# Patient Record
Sex: MALE | Race: WHITE | Employment: FULL TIME | URBAN - METROPOLITAN AREA
[De-identification: names, ages, dates, MRNs, and addresses within clinical notes are randomized per-mention and may not be internally consistent; named-entity substitution may affect disease eponyms.]

---
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
## 2020-01-02 ENCOUNTER — HOSPITAL ENCOUNTER (OUTPATIENT)
Dept: PHYSICAL THERAPY | Age: 20
Setting detail: THERAPIES SERIES
Discharge: HOME | End: 2020-01-02
Payer: COMMERCIAL

## 2020-01-02 PROCEDURE — 97112 NEUROMUSCULAR REEDUCATION: CPT

## 2020-01-02 PROCEDURE — 97110 THERAPEUTIC EXERCISES: CPT

## 2020-01-07 ENCOUNTER — HOSPITAL ENCOUNTER (OUTPATIENT)
Dept: PHYSICAL THERAPY | Age: 20
Setting detail: THERAPIES SERIES
Discharge: HOME | End: 2020-01-07
Payer: COMMERCIAL

## 2020-01-07 PROCEDURE — 97530 THERAPEUTIC ACTIVITIES: CPT

## 2020-01-07 PROCEDURE — 97112 NEUROMUSCULAR REEDUCATION: CPT

## 2020-01-07 PROCEDURE — 97110 THERAPEUTIC EXERCISES: CPT

## 2020-01-07 NOTE — FLOWSHEET NOTE
Outpatient Physical Therapy  [place]           [x] Phone: [phone]   Fax: [phone]  [facility]sh[facility] Manjarrez           [] Phone: [phone]   Fax: [phone]        Physical Therapy Daily Treatment Note  Date:  2020    Patient Name:  Dc Espino    :  2000  MRN: [id number]  Restrictions/Precautions: Other position/activity restrictions: none  Diagnosis:   Diagnosis: LBP, sciatica   Date of Injury/Surgery:   Treatment Diagnosis: Treatment Diagnosis: LBP, neural tension/irritation    Insurance/Certification information: PT Insurance Information: betty Britton   Referring Physician:  Referring Practitioner: Alphonse Bowers  Next Doctor Visit:    Plan of care signed (Y/N):  Y  Outcome Measure: Oswestry 38%  Visit# / total visits:    POC, count verified 19   Pain level: 5/10 back      Goals:       Short term goals  Time Frame for Short term goals: 3 weeks, 19  Short term goal 1: Pt will be able to report at least 25% reduction in pain   Met 12/10  Short term goal 2: Pt will be able to advance ROM and core work w/o increased symptoms   Met 12/10  Short term goal 3: Pt will demonstrate good body mech w/ all bending/lifting Met   Long term goals  Time Frame for Long term goals : 6 weeks, 1/3/20  Long term goal 1: Pt will be independent w/ pain management and HEP to continue after PT Good Progress   Long term goal 2: Pt will be able to work w/ pain < 4/10 at least 75% of the time Not met  - not currently working  Long term goal 3: Pt will be able to return to gym work outs w/o pain or limit Met - with his restrictions   Long term goal 4: Pt will have no LE symptoms w/ usual activity Mostly met     Summary of Evaluation: Assessment: Pt is a 22 yo male who presents w/ LBP w/ sciatica R. He has MRI w/ degenerative and congenital limitations unusal for his age. He demonstrates + neural tension signs and limited lumbar ROM w/ pain.   These limitations are affecting his ability to perform his work and usual ADL's. He will benefit from PT to help reduce pain and restore function and also educate and train for long term managment. Prior to June of this year, he had lower back pain levels (3/10) and no leg symptoms. Subjective:     Leg symptoms mostly resolved currently but back really hurting, exercises help some but not as much. He feels very tight in hips and across low back. Been really achy lately. Of everything he does, rotation stretch helps the most.  Pt is reporting some loss of control of bladder, this is new but has had a \"fuzzy\" feeling in R > L LE but denies in groin. Also some burning w/ urination so we discussed cranberry juice and water to flush system too but is symptoms persist or worsen call doc, He did stop his nerve pill this could be part of it. Frequency of urination up too. Any changes in Ambulatory Summary Sheet? None        Objective:   Pt very mild sacral torsion, TTP R buttock still w/ tight bands noted. Mild sag w/ prone ball walk outs and noted fatigue/mild pain w/ supine leg extensions.            Exercises: (No more than 4 columns)   Exercise/Equipment 12/19/19 12/30/19 1/2/20 1/7/20            WARM UP       Rec Bike 5' 5' 5' 5'          MANUAL:  As below    x                 TABLE       Repeated KTC KTC with ball 10*5\" - -    LTR on ball 10* ea Man  On ball 10* ea man   Dynamic HS stretch 10*10\" ea with some ankle pumps as well for nerve glide Man nerve glides in supine  - --   Figure 4 stretch 3*30\" ea  Man 30\" R 2*30\" ea  man   Butt to heels stretch - 10x10\" holds  10*10\" 5x10\"    Quad alt UE/LE - On ball 10x2ea  On ball 2*10 ea On ball 10x2ea    Ball bridge  2*10 10x2 2*10 10x2   Supine, feet up if can, knee ext to ecc lower to kyra    10x2 ea LE             STANDING       1/2 kneel hip flexor stretch - Man R 30\" - -   Seated on ball, core ball chops  FM and reverse 13# 15* ea; 10# 15* ea 10# CB 15xea  - 10# CB 20xea    [employer] walk outs  - 10x 10* 10x   Palloff press  FM 10# 15* ea dir seated on ball FM 13# 20x ea dir seated on ball FM 13# 20* ea seated on ball  FM 13# 20x ea dir seated on ball   Chops at FM   Reverse chop - 13# 15xea  10# 15xea  13# 15* ea  10# 15* ea --   Side plank   Elbow and feet 30\" x2ea  Elbow and feet 2*30\" ea  --   Hip machine flex and ext    37.5# 15xea way ea LE   Lateral walk TB     GTB knees 50ft x2, ankles 50ft x2                               MODALITIES                         Other Therapeutic Activities/Education:    R Education once again regarding long term management and need to switch things up at the gym and work some on endurance based activities as well and find other ways to challenge himself besides heavy weights. Home Exercise Program:  Issued, practiced and pt demo ability to perform 11/19/2019, 1/7/20 issued GTB and add hip machine at gym         Manual Treatments: MET for sacral torsion, Piriformis release and STM/TPR to deep hip rotators, piriformis stretch figure 4 and diagonal KTC, KTC    attempted some lumbar and lumbopelvic mobs, also some manual stretching as above and TPR to R L/S region. Total time 15'      Modalities:       Communication with other providers:  POC set for cosign 11/19/19, See PN 12/30/19      Assessment:    Vaibhav Tenorio tolerates therapy session well. He reports decrease in pain and stiffness at the end of the sessions and demonstrates good form with all of his exercises with little cueing necessary. He remains stiff in the LB and hip region w/ pain that limits activity and especially repetitive or prolonged standing/bending. He will continue to benefit from PT to help improve activity tolerance and reduce pain.       End session pain: 2/10       Plan for Next Session:   Core and hip strengthening activities, gentle motion/stretching along with dynamic stretching activities, continued education on management and progression of exercises, stretching/mobs prn      Time In / Time Out:  1030/1115    Timed Code/Total Treatment Minutes:    45/45        1 NR 15' 1 TE 15'  1 TA 15'      Next Progress Note due:  See PN 12/30/19      Plan of Care Interventions:  [x] Therapeutic Exercise  [x] Modalities:  [x] Therapeutic Activity     [] Ultrasound  [x] Estim  [] Gait Training      [] Cervical Traction [x] Lumbar Traction  [x] Neuromuscular Re-education    [x] Cold/hotpack [] Iontophoresis   [x] Instruction in HEP      [] Vasopneumatic   [] Dry Needling    [x] Manual Therapy               [] Aquatic Therapy              Electronically signed by:  Vish Palmer, PT, MPT, ATC      1/7/2020, 11:49 AM

## 2020-01-09 ENCOUNTER — HOSPITAL ENCOUNTER (OUTPATIENT)
Dept: PHYSICAL THERAPY | Age: 20
Setting detail: THERAPIES SERIES
Discharge: HOME | End: 2020-01-09
Payer: COMMERCIAL

## 2020-01-09 PROCEDURE — 97112 NEUROMUSCULAR REEDUCATION: CPT

## 2020-01-09 PROCEDURE — 97140 MANUAL THERAPY 1/> REGIONS: CPT

## 2020-01-09 PROCEDURE — 97110 THERAPEUTIC EXERCISES: CPT

## 2020-01-09 NOTE — FLOWSHEET NOTE
Outpatient Physical Therapy  [place]           [x] Phone: [phone]   Fax: [phone]  Raul Bobo           [] Phone: [phone]   Fax: [phone]        Physical Therapy Daily Treatment Note  Date:  2020    Patient Name:  Dasie Lennox    :  2000  MRN: [id number]  Restrictions/Precautions: Other position/activity restrictions: none  Diagnosis:   Diagnosis: LBP, sciatica   Date of Injury/Surgery:   Treatment Diagnosis: Treatment Diagnosis: LBP, neural tension/irritation    Insurance/Certification information: PT Insurance Information: betty Britton   Referring Physician:  Referring Practitioner: Irene Ga Doctor Visit:    Plan of care signed (Y/N):  Y  Outcome Measure: Oswestry 38%  Visit# / total visits:    POC, count verified 19   Pain level: 3/10 back      Goals:       Short term goals  Time Frame for Short term goals: 3 weeks, 19  Short term goal 1: Pt will be able to report at least 25% reduction in pain   Met 12/10  Short term goal 2: Pt will be able to advance ROM and core work w/o increased symptoms   Met 12/10  Short term goal 3: Pt will demonstrate good body mech w/ all bending/lifting Met   Long term goals  Time Frame for Long term goals : 6 weeks, 1/3/20  Long term goal 1: Pt will be independent w/ pain management and HEP to continue after PT Good Progress   Long term goal 2: Pt will be able to work w/ pain < 4/10 at least 75% of the time Not met  - not currently working  Long term goal 3: Pt will be able to return to gym work outs w/o pain or limit Met - with his restrictions   Long term goal 4: Pt will have no LE symptoms w/ usual activity Mostly met     Summary of Evaluation: Assessment: Pt is a 22 yo male who presents w/ LBP w/ sciatica R. He has MRI w/ degenerative and congenital limitations unusal for his age. He demonstrates + neural tension signs and limited lumbar ROM w/ pain.   These limitations are affecting his ability to perform his work and usual ADL's. He will benefit from PT to help reduce pain and restore function and also educate and train for long term managment. Prior to June of this year, he had lower back pain levels (3/10) and no leg symptoms. Subjective: Katie Lozada arrives to therapy stating that the back is okay, not too bad today. He is starting to have difficulty sleeping again due to increased back pain. Feels like the injection is wearing off. Has another injection scheduled for next week. Any changes in Ambulatory Summary Sheet? None        Objective:  Min tightness in deep rotators noted this date and min in piriformis but still a little tender. Does require some cueing with eccentric SLR lower keeping abdominal muscles tight and slowing the descent down for greater challenge.            Exercises: (No more than 4 columns)   Exercise/Equipment 1/2/20 1/7/20 1/9/2020           WARM UP      Rec Bike 5' 5' 5'         MANUAL:  As below  x -               TABLE      LTR on ball On ball 10* ea man -   Dynamic HS stretch - -- -   Figure 4 stretch 2*30\" ea  man 2*30\" ea    Butt to heels stretch 10*10\" 5x10\"  5*10\"   Quad alt UE/LE On ball 2*10 ea On ball 10x2ea  On ball 2*10   Ball bridge  2*10 10x2 2*10   Supine, feet up if can, knee ext to ecc lower to kyra  10x2 ea LE 2*10 ea            STANDING      1/2 kneel hip flexor stretch - -    Seated on ball, core ball chops  - 10# CB 20xea  10# CB 20* ea   Ball walk outs  10* 10x 10*   Palloff press  FM 13# 20* ea seated on ball  FM 13# 20x ea dir seated on ball FM 13# 20* ea   Chops at FM   Reverse chop 13# 15* ea  10# 15* ea -- -   Side plank  Elbow and feet 2*30\" ea  -- -   Hip machine flex and ext  37.5# 15xea way ea LE 37.5# 2*10 ea B   Lateral walk TB   GTB knees 50ft x2, ankles 50ft x2 GTB @ ankle 2*50'                            MODALITIES                      Other Therapeutic Activities/Education:    R Education once again regarding long term

## 2020-01-14 ENCOUNTER — HOSPITAL ENCOUNTER (OUTPATIENT)
Dept: PHYSICAL THERAPY | Age: 20
Setting detail: THERAPIES SERIES
Discharge: HOME | End: 2020-01-14
Payer: COMMERCIAL

## 2020-01-14 PROCEDURE — 97140 MANUAL THERAPY 1/> REGIONS: CPT

## 2020-01-14 NOTE — FLOWSHEET NOTE
Outpatient Physical Therapy  [place]           [x] Phone: [phone]   Fax: [phone]  [place]           [] Phone: [phone]   Fax: [phone]        Physical Therapy Daily Treatment Note  Date:  2020    Patient Name:  Dilip Doyle    :  2000  MRN: [id number]  Restrictions/Precautions: Other position/activity restrictions: none  Diagnosis:   Diagnosis: LBP, sciatica   Date of Injury/Surgery:   Treatment Diagnosis: Treatment Diagnosis: LBP, neural tension/irritation    Insurance/Certification information: PT Insurance Information: betty Britton   Referring Physician:  Referring Practitioner: Luis West  Next Doctor Visit:    Plan of care signed (Y/N):  Y  Outcome Measure: Oswestry 38%  Visit# / total visits:    POC, count verified 19   Pain level: 5/10 back      Goals:       Short term goals  Time Frame for Short term goals: 3 weeks, 19  Short term goal 1: Pt will be able to report at least 25% reduction in pain   Met 12/10  Short term goal 2: Pt will be able to advance ROM and core work w/o increased symptoms   Met 12/10  Short term goal 3: Pt will demonstrate good body mech w/ all bending/lifting Met   Long term goals  Time Frame for Long term goals : 6 weeks, 1/3/20  Long term goal 1: Pt will be independent w/ pain management and HEP to continue after PT Good Progress   Long term goal 2: Pt will be able to work w/ pain < 4/10 at least 75% of the time Not met  - not currently working  Long term goal 3: Pt will be able to return to gym work outs w/o pain or limit Met - with his restrictions   Long term goal 4: Pt will have no LE symptoms w/ usual activity Mostly met     Summary of Evaluation: Assessment: Pt is a 22 yo male who presents w/ LBP w/ sciatica R. He has MRI w/ degenerative and congenital limitations unusal for his age. He demonstrates + neural tension signs and limited lumbar ROM w/ pain.   These limitations are affecting

## 2020-01-15 ENCOUNTER — OFFICE VISIT (OUTPATIENT)
Dept: FAMILY MEDICINE CLINIC | Age: 20
End: 2020-01-15
Payer: COMMERCIAL

## 2020-01-15 VITALS
SYSTOLIC BLOOD PRESSURE: 132 MMHG | HEIGHT: 72 IN | WEIGHT: 181 LBS | HEART RATE: 68 BPM | BODY MASS INDEX: 24.52 KG/M2 | DIASTOLIC BLOOD PRESSURE: 80 MMHG

## 2020-01-15 PROCEDURE — 99213 OFFICE O/P EST LOW 20 MIN: CPT | Performed by: FAMILY MEDICINE

## 2020-01-15 ASSESSMENT — PATIENT HEALTH QUESTIONNAIRE - PHQ9
1. LITTLE INTEREST OR PLEASURE IN DOING THINGS: 1
SUM OF ALL RESPONSES TO PHQ QUESTIONS 1-9: 2
SUM OF ALL RESPONSES TO PHQ9 QUESTIONS 1 & 2: 2
SUM OF ALL RESPONSES TO PHQ QUESTIONS 1-9: 2
2. FEELING DOWN, DEPRESSED OR HOPELESS: 1

## 2020-01-15 ASSESSMENT — ENCOUNTER SYMPTOMS: BACK PAIN: 1

## 2020-01-15 NOTE — PATIENT INSTRUCTIONS
The diagnoses and medications listed in this after visit summary may not be accurate at the time of check out. Please check MY CHART in 28-48 hours for possible corrections. Late cancellation policy: So that we can better accommodate people who are sick, please give our office 24 hour notice for an appointment cancellation. Thank you. Missed appointments: Your care is very important to us. It is important that you keep your scheduled appointments. Multiple missed appointments may lead to a dismissal from the office. Later arrival policy: If you are more than 10 minutes late for your appointment, you will be asked to reschedule. Please allow 5-7 business days for paperwork to be processed. It is important that you check your MY Chart messages, as they include appointment reminders, test results, and other important information. If you have forgotten your password, please call [phone].

## 2020-01-15 NOTE — LETTER
[facility]  [facility] [address] [place] [address]  Phone: [phone]  Fax: [phone]    Imelda Clark MD        January 15, 2020     Patient: Richard Garcia   YOB: 2000   Date of Visit: 1/15/2020       To Whom It May Concern: It is my medical opinion that Enedina Raines may return to full duty immediately with no restrictions.         Sincerely,        Imelda Clark MD

## 2020-01-16 ENCOUNTER — HOSPITAL ENCOUNTER (OUTPATIENT)
Dept: PHYSICAL THERAPY | Age: 20
Setting detail: THERAPIES SERIES
Discharge: HOME | End: 2020-01-16
Payer: COMMERCIAL

## 2020-01-16 PROCEDURE — 97110 THERAPEUTIC EXERCISES: CPT

## 2020-01-16 NOTE — FLOWSHEET NOTE
Outpatient Physical Therapy  [place]           [x] Phone: [phone]   Fax: [phone]  [facility] [place]           [] Phone: [phone]   Fax: [phone]        Physical Therapy Daily Treatment Note  Date:  2020    Patient Name:  Ally Anderson    :  2000  MRN: [id number]  Restrictions/Precautions: Other position/activity restrictions: none  Diagnosis:   Diagnosis: LBP, sciatica   Date of Injury/Surgery:   Treatment Diagnosis: Treatment Diagnosis: LBP, neural tension/irritation    Insurance/Certification information: PT Insurance Information: betty Britton   Referring Physician:  Referring Practitioner: Addison Neri  Next Doctor Visit:    Plan of care signed (Y/N):  Y  Outcome Measure: Oswestry 38%  Visit# / total visits:   15/20 POC, count verified 19   Pain level: 4/10 back      Goals:       Short term goals  Time Frame for Short term goals: 3 weeks, 19  Short term goal 1: Pt will be able to report at least 25% reduction in pain   Met 12/10  Short term goal 2: Pt will be able to advance ROM and core work w/o increased symptoms   Met 12/10  Short term goal 3: Pt will demonstrate good body mech w/ all bending/lifting Met   Long term goals  Time Frame for Long term goals : 6 weeks, 1/3/20  Long term goal 1: Pt will be independent w/ pain management and HEP to continue after PT Good Progress   Long term goal 2: Pt will be able to work w/ pain < 4/10 at least 75% of the time Not met  - not currently working  Long term goal 3: Pt will be able to return to gym work outs w/o pain or limit Met - with his restrictions   Long term goal 4: Pt will have no LE symptoms w/ usual activity Mostly met     Summary of Evaluation: Assessment: Pt is a 24 yo male who presents w/ LBP w/ sciatica R. He has MRI w/ degenerative and congenital limitations unusal for his age. He demonstrates + neural tension signs and limited lumbar ROM w/ pain.   These limitations are affecting 11/19/19, See PN 12/30/19      Assessment:     Ray Doctor tolerated today's session well. We ramped things up a bit and he reported no increase in pain with the new activities as well as demonstrated good form.        End session pain: 1/10       Plan for Next Session:   Core and hip strengthening activities, gentle motion/stretching along with dynamic stretching activities, continued education on management and progression of exercises, stretching/mobs prn      Time In / Time Out:  1045/1115    Timed Code/Total Treatment Minutes:    27' 2 TE       Next Progress Note due:  See PN 12/30/19      Plan of Care Interventions:  [x] Therapeutic Exercise  [x] Modalities:  [x] Therapeutic Activity     [] Ultrasound  [x] Estim  [] Gait Training      [] Cervical Traction [x] Lumbar Traction  [x] Neuromuscular Re-education    [x] Cold/hotpack [] Iontophoresis   [x] Instruction in HEP      [] Vasopneumatic   [] Dry Needling    [x] Manual Therapy               [] Aquatic Therapy              Electronically signed by:  Rashmi Quinn PTA         1/16/2020, 8:26 AM

## 2020-01-21 ENCOUNTER — HOSPITAL ENCOUNTER (OUTPATIENT)
Dept: PHYSICAL THERAPY | Age: 20
Setting detail: THERAPIES SERIES
Discharge: HOME | End: 2020-01-21
Payer: COMMERCIAL

## 2020-01-21 PROCEDURE — 97110 THERAPEUTIC EXERCISES: CPT

## 2020-01-21 NOTE — FLOWSHEET NOTE
Outpatient Physical Therapy  [place]           [x] Phone: [phone]   Fax: [phone]  [place]           [] Phone: [phone]   Fax: [phone]        Physical Therapy Daily Treatment Note  Date:  2020    Patient Name:  Mino Fisher    :  2000  MRN: [id number]  Restrictions/Precautions: Other position/activity restrictions: none  Diagnosis:   Diagnosis: LBP, sciatica   Date of Injury/Surgery:   Treatment Diagnosis: Treatment Diagnosis: LBP, neural tension/irritation    Insurance/Certification information: PT Insurance Information: betty Britton   Referring Physician:  Referring Practitioner: Bouchra Castle  Next Doctor Visit:    Plan of care signed (Y/N):  Y  Outcome Measure: Oswestry 38%  Visit# / total visits:    POC, count verified 19   Pain level: 1/10      Goals:       Short term goals  Time Frame for Short term goals: 3 weeks, 19  Short term goal 1: Pt will be able to report at least 25% reduction in pain   Met 12/10  Short term goal 2: Pt will be able to advance ROM and core work w/o increased symptoms   Met 12/10  Short term goal 3: Pt will demonstrate good body mech w/ all bending/lifting Met   Long term goals  Time Frame for Long term goals : 6 weeks, 1/3/20  Long term goal 1: Pt will be independent w/ pain management and HEP to continue after PT Good Progress   Long term goal 2: Pt will be able to work w/ pain < 4/10 at least 75% of the time Not met  - not currently working  Long term goal 3: Pt will be able to return to gym work outs w/o pain or limit Met - with his restrictions   Long term goal 4: Pt will have no LE symptoms w/ usual activity Mostly met     Summary of Evaluation: Assessment: Pt is a 22 yo male who presents w/ LBP w/ sciatica R. He has MRI w/ degenerative and congenital limitations unusal for his age. He demonstrates + neural tension signs and limited lumbar ROM w/ pain.   These limitations are affecting his continuing work outs on his own.        End session pain: 0/10       Plan for Next Session:   D/C next session       Time In / Time Out: 1050/1120    Timed Code/Total Treatment Minutes:     27'  2 TE       Next Progress Note due:  See PN 12/30/19      Plan of Care Interventions:  [x] Therapeutic Exercise  [x] Modalities:  [x] Therapeutic Activity     [] Ultrasound  [x] Estim  [] Gait Training      [] Cervical Traction [x] Lumbar Traction  [x] Neuromuscular Re-education    [x] Cold/hotpack [] Iontophoresis   [x] Instruction in HEP      [] Vasopneumatic   [] Dry Needling    [x] Manual Therapy               [] Aquatic Therapy              Electronically signed by:  Irlanda Polanco PTA         1/21/2020, 8:17 AM

## 2020-01-23 ENCOUNTER — HOSPITAL ENCOUNTER (OUTPATIENT)
Dept: PHYSICAL THERAPY | Age: 20
Setting detail: THERAPIES SERIES
Discharge: HOME | End: 2020-01-23
Payer: COMMERCIAL

## 2020-01-23 PROCEDURE — G0283 ELEC STIM OTHER THAN WOUND: HCPCS

## 2020-01-23 PROCEDURE — 97110 THERAPEUTIC EXERCISES: CPT

## 2020-01-23 PROCEDURE — 97535 SELF CARE MNGMENT TRAINING: CPT

## 2020-01-23 NOTE — PROGRESS NOTES
Outpatient Physical Therapy           [place]           [x] Phone: [phone]   Fax: [phone]  [facility]           [] Phone: [phone]   Fax: [phone]      To:Damien       From: Barb Garcia, PT     Patient: Gama Woods                  : 2000  Diagnosis:   LBP, sciatica      Date: 2020  Treatment Diagnosis: LBP, neural tension/irritation    []  Progress Note                [x]  Discharge Note    Evaluation Date:  19 Total Visits to date:   16 Cancels/No-shows to date:  3    Subjective:  Back has been a little sore and stiff but stretching does help. He gets occasional leg symptoms still and it's mostly w/ sitting and especially on soft chairs w/ bad posture. Pt reports pain in last 2 weeks still about 4/10, but 2/10 today. He has been going to gym and doing his back strength and regular strength activity too. Injections have helped, to follow up w/ doc if needs to, if symptoms return. He is looking for a job that will hopefully not aggravate his back. He reports being 73% of normal which is happy with at this time and would like to try to manage his symptoms on his own. Plan of Care/Treatment to date:  [x] Therapeutic Exercise    [x] Modalities:  [x] Therapeutic Activity     [] Ultrasound  [x] Electrical Stimulation  [] Gait Training      [] Cervical Traction   [] Lumbar Traction  [x] Neuromuscular Re-education  [x] Cold/hotpack [] Iontophoresis  [x] Instruction in HEP      Other:  [x] Manual Therapy       []  Vasopneumatic  [] Aquatic Therapy       []                          Objective/Significant Findings At Last Visit/Comments: Lumbar AROM is WNL now but still very mild symptoms w/ ext. MMT is WNL still but he also still has + slump on R, though can tolerate more tension that used to and symptoms are less intense. Pt core strength is improving and WFL but challenged by exercises that were easy for him prior to this episode.      Assessment: Pino Jerome continues to do well with exercises in the clinic with increased challenge over the last several sessions. He does still demonstrate some core weakness; however, he has a good understanding of exercise progression as well as good form with his exercises. He will likely do well continuing work outs on his own. We will d/c to HEP at this time and pt will follow up w/ doctor prn.     End session pain: 0/10       Goal Status:  [] Achieved [x] Partially Achieved  [] Not Achieved   Short term goals  Time Frame for Short term goals: 3 weeks, 12/13/19  Short term goal 1: Pt will be able to report at least 25% reduction in pain   Met 12/10  Short term goal 2: Pt will be able to advance ROM and core work w/o increased symptoms   Met 12/10  Short term goal 3: Pt will demonstrate good body mech w/ all bending/lifting Met 11/26  Long term goals  Time Frame for Long term goals : 6 weeks, 1/3/20  Long term goal 1: Pt will be independent w/ pain management and HEP to continue after PT Met 1/23  Long term goal 2: Pt will be able to work w/ pain < 4/10 at least 75% of the time Not met 12/19 - not currently working  Long term goal 3: Pt will be able to return to gym work outs w/o pain or limit Met - with his restrictions 1/23  Long term goal 4: Pt will have no LE symptoms w/ usual activity Mostly met 1/23    Frequency/Duration:  # Days per week: [] 1 day # Weeks: [] 1 week [] 4 weeks [] 8 weeks     [x] 2 days   [] 2 weeks [] 5 weeks [x] 10 weeks     [] 3 days   [] 3 weeks [] 6 weeks [] 12 weeks       Rehab Potential: [] Excellent [x] Good [] Daniele Brown  [] Poor    Patient Status: [] Continue per initial plan of Care     [x] Patient now discharged     [] Additional visits requested, Please re-certify for additional visits:      Requested frequency/duration:  /week for  weeks    If we are requesting more visits, we fully anticipate the patient's condition is expected to improve within the treatment timeframe we are

## 2020-01-29 RX ORDER — GABAPENTIN 300 MG/1
300 CAPSULE ORAL 3 TIMES DAILY
Qty: 90 CAPSULE | Refills: 0 | Status: SHIPPED | OUTPATIENT
Start: 2020-01-29 | End: 2020-07-14 | Stop reason: SDUPTHER

## 2020-07-14 ENCOUNTER — OFFICE VISIT (OUTPATIENT)
Dept: FAMILY MEDICINE CLINIC | Age: 20
End: 2020-07-14
Payer: COMMERCIAL

## 2020-07-14 VITALS
HEIGHT: 72 IN | TEMPERATURE: 98.7 F | HEART RATE: 99 BPM | BODY MASS INDEX: 23.13 KG/M2 | SYSTOLIC BLOOD PRESSURE: 124 MMHG | DIASTOLIC BLOOD PRESSURE: 80 MMHG | WEIGHT: 170.8 LBS

## 2020-07-14 PROCEDURE — 99213 OFFICE O/P EST LOW 20 MIN: CPT | Performed by: FAMILY MEDICINE

## 2020-07-14 RX ORDER — GABAPENTIN 300 MG/1
300 CAPSULE ORAL 3 TIMES DAILY
Qty: 90 CAPSULE | Refills: 0 | Status: SHIPPED | OUTPATIENT
Start: 2020-07-14 | End: 2021-02-10 | Stop reason: SDUPTHER

## 2020-07-14 RX ORDER — IBUPROFEN 800 MG/1
800 TABLET ORAL 3 TIMES DAILY PRN
Qty: 60 TABLET | Refills: 0 | Status: SHIPPED | OUTPATIENT
Start: 2020-07-14 | End: 2020-08-31 | Stop reason: SDUPTHER

## 2020-07-14 ASSESSMENT — ENCOUNTER SYMPTOMS
DIARRHEA: 0
SHORTNESS OF BREATH: 0
ABDOMINAL PAIN: 0
COUGH: 0

## 2020-07-14 NOTE — PROGRESS NOTES
Patient ID: Rodney Ford 2000    Chief Complaint   Patient presents with    Back Pain     7/10         HPI     Back pain:  Has slacked off on the back rehab exercises. Back now hurting again for 2 1/2 months. Radiates to right ankle. 7/10. No loss control of urine or stool. No crotch numbness. No weakness legs. Not as bad as last year. In the past, PT helped. Last PT session 6 months ago and last pain injection 6 months ago. Review of Systems   Constitutional: Negative for chills, diaphoresis and fever. HENT:        No change in taste or smell sensation   Respiratory: Negative for cough (no unusual) and shortness of breath (no unusual). Gastrointestinal: Negative for abdominal pain and diarrhea. Musculoskeletal: Negative for myalgias. Neurological: Negative for headaches. Patient Active Problem List   Diagnosis    Anxiety    Lumbosacral radiculopathy at S1       Past Surgical History:   Procedure Laterality Date    PILONIDAL CYST EXCISION  08/08/2018    Excision pilonidal cyst and sinus       Family History   Problem Relation Age of Onset    COPD Father     Hypertension Father     Heart Disease Father     Thyroid Disease Mother     Asthma Brother     Cystic Fibrosis Brother     Other Brother         trisomy 25    Diabetes Maternal Grandmother        Current Outpatient Medications on File Prior to Visit   Medication Sig Dispense Refill    ibuprofen (ADVIL;MOTRIN) 800 MG tablet Take 1 tablet by mouth every 8 hours as needed for Pain 40 tablet 1    methylPREDNISolone (MEDROL DOSEPACK) 4 MG tablet Finished today  0    Cetirizine HCl (ZYRTEC ALLERGY) 10 MG CAPS Take 1 tablet by mouth daily       No current facility-administered medications on file prior to visit. Objective:           Physical Exam  Vitals signs and nursing note reviewed. Musculoskeletal: Normal range of motion.       Lumbar back: He exhibits normal range of motion, no tenderness, no bony tenderness, no swelling, no edema, no deformity, no laceration, no pain and no spasm. Back:       Comments: Positive bilateral straight leg raises, worse on the right.    5/5 strength quadriceps and leg extenders   Skin:     General: Skin is warm and dry. Neurological:      Gait: Gait normal.      Deep Tendon Reflexes:      Reflex Scores:       Patellar reflexes are 2+ on the right side and 2+ on the left side. Achilles reflexes are 2+ on the right side and 2+ on the left side. Comments: No numbness legs. Vitals:    07/14/20 1552   BP: 124/80   Pulse: 99   Temp: 98.7 °F (37.1 °C)   TempSrc: Temporal   Weight: 170 lb 12.8 oz (77.5 kg)   Height: 6' (1.829 m)     Body mass index is 23.16 kg/m². Wt Readings from Last 3 Encounters:   07/14/20 170 lb 12.8 oz (77.5 kg)   01/15/20 181 lb (82.1 kg) (82 %, Z= 0.90)*   12/03/19 185 lb 3.2 oz (84 kg) (85 %, Z= 1.03)*     * Growth percentiles are based on CDC (Boys, 2-20 Years) data. BP Readings from Last 3 Encounters:   07/14/20 124/80   01/15/20 132/80   12/03/19 110/70          No results found for this visit on 07/14/20. Assessment:       Diagnosis Orders   1. Lumbosacral radiculopathy at [address] [place] [address] Physical Therapy    gabapentin (NEURONTIN) 300 MG capsule   2. Chronic right-sided low back pain with right-sided sciatica  [facility] Physical Therapy    ibuprofen (ADVIL;MOTRIN) 800 MG tablet    gabapentin (NEURONTIN) 300 MG capsule           Plan:      See orders    He is to contact his doctor who gave him the back injections    I am reassured by the fact that his reflexes are normal today. In the past he had some decreased deep tendon reflexes on the right. Recheck as needed virtually. Return if symptoms worsen or fail to improve.

## 2020-07-14 NOTE — PATIENT INSTRUCTIONS
October Fifth is the DEADLINE for voter registration for the November Third GENERAL ELECTION. Don't forget to vote!!!        [address] TO ALL APPOINTMENTS    The diagnoses and medications listed in this after visit summary may not be accurate at the time of check out. Please check MY CHART in 28-48 hours for possible corrections. Late cancellation policy: So that we can better accommodate people who are sick, please give our office 24 hour notice for an appointment cancellation. Thank you. Missed appointments: Your care is very important to us. It is important that you keep your scheduled appointments. Multiple missed appointments will lead to a dismissal from the office. Later arrival policy: If you are more than 10 minutes late for your appointment, you will be asked to reschedule. Please allow 5-7 business days for paperwork to be processed. It is important that you check your MY Chart messages, as they include appointment reminders, test results, and other important information. If you have forgotten your password, please call [phone].

## 2020-07-28 ENCOUNTER — OFFICE VISIT (OUTPATIENT)
Dept: PRIMARY CARE CLINIC | Age: 20
End: 2020-07-28
Payer: COMMERCIAL

## 2020-07-28 ENCOUNTER — HOSPITAL ENCOUNTER (OUTPATIENT)
Age: 20
Setting detail: SPECIMEN
Discharge: HOME | End: 2020-07-28
Payer: COMMERCIAL

## 2020-07-28 VITALS — OXYGEN SATURATION: 98 % | HEART RATE: 109 BPM | TEMPERATURE: 97.2 F

## 2020-07-28 PROCEDURE — U0002 COVID-19 LAB TEST NON-CDC: HCPCS

## 2020-07-28 PROCEDURE — 99211 OFF/OP EST MAY X REQ PHY/QHP: CPT | Performed by: INTERNAL MEDICINE

## 2020-07-30 LAB
SARS-COV-2: NOT DETECTED
SOURCE: NORMAL

## 2020-08-31 ENCOUNTER — TELEPHONE (OUTPATIENT)
Dept: FAMILY MEDICINE CLINIC | Age: 20
End: 2020-08-31

## 2020-08-31 RX ORDER — IBUPROFEN 800 MG/1
800 TABLET ORAL 3 TIMES DAILY PRN
Qty: 60 TABLET | Refills: 0 | Status: SHIPPED | OUTPATIENT
Start: 2020-08-31 | End: 2021-02-10

## 2020-09-21 RX ORDER — IBUPROFEN 800 MG/1
TABLET ORAL
Qty: 60 TABLET | Refills: 0 | OUTPATIENT
Start: 2020-09-21

## 2021-02-10 ENCOUNTER — VIRTUAL VISIT (OUTPATIENT)
Dept: FAMILY MEDICINE CLINIC | Age: 21
End: 2021-02-10
Payer: COMMERCIAL

## 2021-02-10 DIAGNOSIS — G89.29 CHRONIC RIGHT-SIDED LOW BACK PAIN WITH RIGHT-SIDED SCIATICA: Primary | ICD-10-CM

## 2021-02-10 DIAGNOSIS — Z13.31 POSITIVE DEPRESSION SCREENING: ICD-10-CM

## 2021-02-10 DIAGNOSIS — M54.41 CHRONIC RIGHT-SIDED LOW BACK PAIN WITH RIGHT-SIDED SCIATICA: Primary | ICD-10-CM

## 2021-02-10 DIAGNOSIS — M54.17 LUMBOSACRAL RADICULOPATHY AT S1: ICD-10-CM

## 2021-02-10 PROCEDURE — 99214 OFFICE O/P EST MOD 30 MIN: CPT | Performed by: FAMILY MEDICINE

## 2021-02-10 RX ORDER — GABAPENTIN 300 MG/1
300 CAPSULE ORAL NIGHTLY
Qty: 90 CAPSULE | Refills: 1 | Status: SHIPPED | OUTPATIENT
Start: 2021-02-10 | End: 2021-04-20 | Stop reason: DRUGHIGH

## 2021-02-10 RX ORDER — IBUPROFEN 800 MG/1
800 TABLET ORAL 3 TIMES DAILY PRN
Qty: 60 TABLET | Refills: 2 | Status: SHIPPED | OUTPATIENT
Start: 2021-02-10 | End: 2021-04-04

## 2021-02-10 ASSESSMENT — COLUMBIA-SUICIDE SEVERITY RATING SCALE - C-SSRS
1. WITHIN THE PAST MONTH, HAVE YOU WISHED YOU WERE DEAD OR WISHED YOU COULD GO TO SLEEP AND NOT WAKE UP?: NO
6. HAVE YOU EVER DONE ANYTHING, STARTED TO DO ANYTHING, OR PREPARED TO DO ANYTHING TO END YOUR LIFE?: NO
2. HAVE YOU ACTUALLY HAD ANY THOUGHTS OF KILLING YOURSELF?: NO

## 2021-02-10 ASSESSMENT — PATIENT HEALTH QUESTIONNAIRE - PHQ9
9. THOUGHTS THAT YOU WOULD BE BETTER OFF DEAD, OR OF HURTING YOURSELF: 0
SUM OF ALL RESPONSES TO PHQ QUESTIONS 1-9: 15
8. MOVING OR SPEAKING SO SLOWLY THAT OTHER PEOPLE COULD HAVE NOTICED. OR THE OPPOSITE, BEING SO FIGETY OR RESTLESS THAT YOU HAVE BEEN MOVING AROUND A LOT MORE THAN USUAL: 1
2. FEELING DOWN, DEPRESSED OR HOPELESS: 3
3. TROUBLE FALLING OR STAYING ASLEEP: 1
SUM OF ALL RESPONSES TO PHQ QUESTIONS 1-9: 15
SUM OF ALL RESPONSES TO PHQ9 QUESTIONS 1 & 2: 6
10. IF YOU CHECKED OFF ANY PROBLEMS, HOW DIFFICULT HAVE THESE PROBLEMS MADE IT FOR YOU TO DO YOUR WORK, TAKE CARE OF THINGS AT HOME, OR GET ALONG WITH OTHER PEOPLE: 1

## 2021-02-10 ASSESSMENT — ENCOUNTER SYMPTOMS: COUGH: 0

## 2021-02-10 NOTE — PROGRESS NOTES
2/10/2021    TELEHEALTH EVALUATION -- Audio/Visual (During ZJLRL-31 public health emergency)    HPI:    Brandon Ford (:  2000) has requested an audio/video evaluation for the following concern(s):    Chronic back pain: numbness outer left thigh. Not as much physical work anymore but walking a lot more. No loss control bowels and urine. Past treatment include back injections and anti inflammatories. Pain is 7/10. Needs RF medication    Positive depression screen: 75-80 hours per week. Getting worn out. 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 Loss of appetite. Sleeps well once falls asleep. Difficulty falling asleep. Works 3 rd shift. Sleeps 6-7 hours. Stressed by father's health---had stroke. Had stroke after Prem but he is still in the     Review of Systems   Constitutional: Negative for chills, diaphoresis and fever. Respiratory: Negative for cough. Psychiatric/Behavioral: Positive for dysphoric mood and sleep disturbance. Negative for suicidal ideas. The patient is nervous/anxious. Prior to Visit Medications    Medication Sig Taking? Authorizing Provider   ibuprofen (ADVIL;MOTRIN) 800 MG tablet Take 1 tablet by mouth 3 times daily as needed for Pain Yes Randa Rivers MD   gabapentin (NEURONTIN) 300 MG capsule Take 1 capsule by mouth nightly for 90 doses.  Yes Randa Rivers MD   ibuprofen (ADVIL;MOTRIN) 800 MG tablet Take 1 tablet by mouth every 8 hours as needed for Pain Yes Randa Rivers MD   Cetirizine HCl (ZYRTEC ALLERGY) 10 MG CAPS Take 1 tablet by mouth daily Yes Historical Provider, MD       Social History     Tobacco Use    Smoking status: Never Smoker    Smokeless tobacco: Never Used    Tobacco comment: vap   Substance Use Topics    Alcohol use: No    Drug use: Yes     Types: Marijuana     Comment: daily- average water pipe 3-4 times per day\"        No Known Allergies,   Past Medical History:   Diagnosis Date    Anxiety     Depression     Environmental allergies        PHYSICAL EXAMINATION:  [ INSTRUCTIONS:  \"[x]\" Indicates a positive item  \"[]\" Indicates a negative item  -- DELETE ALL ITEMS NOT EXAMINED]  Vital Signs: (As obtained by patient/caregiver or practitioner observation)    Blood pressure-  Heart rate-    Respiratory rate-    Temperature-  Pulse oximetry-     Constitutional: [x] Appears well-developed and well-nourished [x] No apparent distress      [] Abnormal-   Mental status  [x] Alert and awake  [x] Oriented to person/place/time []Able to follow commands      Eyes:  EOM    []  Normal  [] Abnormal-  Sclera  []  Normal  [] Abnormal -         Discharge []  None visible  [] Abnormal -    HENT:   [x] Normocephalic, atraumatic. [] Abnormal   [] Mouth/Throat: Mucous membranes are moist.     External Ears [x] Normal  [] Abnormal-     Neck: [x] No visualized mass     Pulmonary/Chest: [x] Respiratory effort normal.  [x] No visualized signs of difficulty breathing or respiratory distress        [] Abnormal-      Musculoskeletal:   [] Normal gait with no signs of ataxia         [] Normal range of motion of neck        [] Abnormal-       Neurological:        [x] No Facial Asymmetry (Cranial nerve 7 motor function) (limited exam to video visit)          [] No gaze palsy        [] Abnormal-         Skin:        [x] No significant exanthematous lesions or discoloration noted on facial skin         [] Abnormal-            Psychiatric:       [x] Normal Affect [x] No Hallucinations        [] Abnormal-     Other pertinent observable physical exam findings-      Diagnosis Orders   1. Chronic right-sided low back pain with right-sided sciatica  ibuprofen (ADVIL;MOTRIN) 800 MG tablet    gabapentin (NEURONTIN) 300 MG capsule   2. Positive depression screening     3. Lumbosacral radiculopathy at S1  gabapentin (NEURONTIN) 300 MG capsule         See orders. He can also call his pain specialist to set up pain injections again.  If injections do not work, he may need to consider surgery. Offered treatment for his depression screening. He declines. He think it is related to the stress with his father and his work situation. I told him he could call back and let us know if he wanted treatment. Otherwise recheck as needed. Return if symptoms worsen or fail to improve. Nuno Prieto is a 21 y.o. male being evaluated by a Virtual Visit (video visit) encounter to address concerns as mentioned above. A caregiver was present when appropriate. Due to this being a TeleHealth encounter (During CJXRK-99 public health emergency), evaluation of the following organ systems was limited: Vitals/Constitutional/EENT/Resp/CV/GI//MS/Neuro/Skin/Heme-Lymph-Imm. Pursuant to the emergency declaration under the [address] [place] [address], [address] [place] [address] authority and the [employer] and [employer] Act, this Virtual Visit was conducted with patient's (and/or legal guardian's) consent, to reduce the patient's risk of exposure to COVID-19 and provide necessary medical care. The patient (and/or legal guardian) has also been advised to contact this office for worsening conditions or problems, and seek emergency medical treatment and/or call 911 if deemed necessary. Patient identification was verified at the start of the visit: Yes    Total time spent on this encounter: Not billed by time    Services were provided through a video synchronous discussion virtually to substitute for in-person clinic visit. Patient and provider were located at their individual homes. --Rick Whitten MD on 2/10/2021 at 9:30 AM    An electronic signature was used to authenticate this note.

## 2021-02-10 NOTE — PATIENT INSTRUCTIONS
PLEASE BRING YOUR MEDICATIONS TO ALL APPOINTMENTS    The diagnoses and medications listed in this after visit summary may not be accurate at the time of check out. Please check MY CHART in 28-48 hours for possible corrections. Late cancellation policy: So that we can better accommodate people who are sick, please give our office 24 hour notice for an appointment cancellation. Thank you. Missed appointments: Your care is very important to us. It is important that you keep your scheduled appointments. Multiple missed appointments will lead to a dismissal from the office. Later arrival policy: If you are more than 10 minutes late for your appointment, you will be asked to reschedule. Please allow 5-7 business days for paperwork to be processed. It is important that you check your MY Chart messages, as they include appointment reminders, test results, and other important information. If you have forgotten your password, please call [phone]. HERE ARE SOME LIFE CHANGING TIPS      1. Take your blood pressure medications at night. This reduces your chance of cardiovascular event by half  2. Fever in kids: It's best to give both Tylenol and Ibuprofen at the same time rather than staggering them which is confusing  3. Follow these tips to reduce childhood obesity: Reduce unnecessary exposure to antibiotics, consume whole milk instead of skim milk, watch public TV instead of regular TV (less exposure to junk food commercials), and reduce traumatic experiences. 4. 1 egg per day is good for your heart  5. Alternate day fasting does promote weight loss. 6. Skipping breakfast increases your risk of obesity  7. Artificially sweetened drinks increase all cause mortality (strokes, BMI, cardiovascular)  8. Kale consumption can reduce onset of dementia  9. Walking at least 8000 steps per day and resistance exercise 2-3 x per week are good for your heart  10.  Covid 19:  Wearing gloves is not that helpful  Having low vitamin D levels increases risk of infection (take 2-4000 units of D3 per day until our covid crisis is resolved)  11.  Brushing teeth 3 times per day can decrease chance of getting diabetes

## 2021-04-04 ENCOUNTER — APPOINTMENT (OUTPATIENT)
Dept: GENERAL RADIOLOGY | Age: 21
End: 2021-04-04
Payer: COMMERCIAL

## 2021-04-04 ENCOUNTER — HOSPITAL ENCOUNTER (EMERGENCY)
Age: 21
Discharge: HOME | End: 2021-04-04
Attending: EMERGENCY MEDICINE
Payer: COMMERCIAL

## 2021-04-04 VITALS
SYSTOLIC BLOOD PRESSURE: 123 MMHG | HEART RATE: 95 BPM | WEIGHT: 165 LBS | DIASTOLIC BLOOD PRESSURE: 62 MMHG | TEMPERATURE: 98.3 F | RESPIRATION RATE: 16 BRPM | BODY MASS INDEX: 22.35 KG/M2 | OXYGEN SATURATION: 100 % | HEIGHT: 72 IN
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
## 2021-04-04 DIAGNOSIS — S70.01XA CONTUSION OF RIGHT HIP, INITIAL ENCOUNTER: ICD-10-CM

## 2021-04-04 DIAGNOSIS — S60.419A ABRASION OF FINGER OF RIGHT HAND, INITIAL ENCOUNTER: ICD-10-CM

## 2021-04-04 PROCEDURE — 99285 EMERGENCY DEPT VISIT HI MDM: CPT

## 2021-04-04 PROCEDURE — 73130 X-RAY EXAM OF HAND: CPT

## 2021-04-04 PROCEDURE — 73502 X-RAY EXAM HIP UNI 2-3 VIEWS: CPT

## 2021-04-04 PROCEDURE — 71045 X-RAY EXAM CHEST 1 VIEW: CPT

## 2021-04-04 PROCEDURE — 6370000000 HC RX 637 (ALT 250 FOR IP): Performed by: EMERGENCY MEDICINE

## 2021-04-04 RX ORDER — METHOCARBAMOL 750 MG/1
750 TABLET, FILM COATED ORAL ONCE
Status: COMPLETED | OUTPATIENT
Start: 2021-04-04 | End: 2021-04-04

## 2021-04-04 RX ORDER — IBUPROFEN 600 MG/1
600 TABLET ORAL ONCE
Status: COMPLETED | OUTPATIENT
Start: 2021-04-04 | End: 2021-04-04

## 2021-04-04 RX ORDER — METHOCARBAMOL 500 MG/1
500 TABLET, FILM COATED ORAL 4 TIMES DAILY
Qty: 40 TABLET | Refills: 0 | Status: SHIPPED | OUTPATIENT
Start: 2021-04-04 | End: 2021-04-14

## 2021-04-04 RX ORDER — IBUPROFEN 800 MG/1
800 TABLET ORAL EVERY 8 HOURS PRN
Qty: 30 TABLET | Refills: 0 | Status: SHIPPED | OUTPATIENT
Start: 2021-04-04 | End: 2021-04-20 | Stop reason: SDUPTHER

## 2021-04-04 RX ADMIN — IBUPROFEN 600 MG: 600 TABLET, FILM COATED ORAL at 16:42

## 2021-04-04 RX ADMIN — METHOCARBAMOL TABLETS 750 MG: 750 TABLET, COATED ORAL at 17:41

## 2021-04-04 ASSESSMENT — PAIN DESCRIPTION - PAIN TYPE: TYPE: ACUTE PAIN

## 2021-04-04 ASSESSMENT — PAIN SCALES - GENERAL
PAINLEVEL_OUTOF10: 9
PAINLEVEL_OUTOF10: 9

## 2021-04-04 ASSESSMENT — PAIN DESCRIPTION - ORIENTATION: ORIENTATION: RIGHT

## 2021-04-04 ASSESSMENT — PAIN DESCRIPTION - LOCATION: LOCATION: HIP;BACK;LEG

## 2021-04-04 NOTE — ED NOTES
Bed: 01TR-01  Expected date:   Expected time:   Means of arrival:   Comments:  Poss trauma- awaiting call from Dima Gabriel RN  04/04/21 6716
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
## 2021-04-06 ENCOUNTER — OFFICE VISIT (OUTPATIENT)
Dept: FAMILY MEDICINE CLINIC | Age: 21
End: 2021-04-06
Payer: COMMERCIAL

## 2021-04-06 VITALS
OXYGEN SATURATION: 96 % | SYSTOLIC BLOOD PRESSURE: 128 MMHG | DIASTOLIC BLOOD PRESSURE: 60 MMHG | TEMPERATURE: 97.7 F | WEIGHT: 172.2 LBS | HEART RATE: 89 BPM | BODY MASS INDEX: 23.35 KG/M2
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
## 2021-04-06 DIAGNOSIS — M54.41 ACUTE RIGHT-SIDED LOW BACK PAIN WITH RIGHT-SIDED SCIATICA: ICD-10-CM

## 2021-04-06 PROCEDURE — 99214 OFFICE O/P EST MOD 30 MIN: CPT | Performed by: FAMILY MEDICINE

## 2021-04-06 PROCEDURE — 96372 THER/PROPH/DIAG INJ SC/IM: CPT | Performed by: FAMILY MEDICINE

## 2021-04-06 RX ORDER — TIZANIDINE 4 MG/1
4 TABLET ORAL EVERY 8 HOURS PRN
Qty: 60 TABLET | Refills: 1 | Status: SHIPPED | OUTPATIENT
Start: 2021-04-06

## 2021-04-06 RX ORDER — KETOROLAC TROMETHAMINE 30 MG/ML
60 INJECTION, SOLUTION INTRAMUSCULAR; INTRAVENOUS ONCE
Qty: 2 ML | Refills: 0
Start: 2021-04-06 | End: 2021-04-06 | Stop reason: CLARIF

## 2021-04-06 RX ORDER — IBUPROFEN 800 MG/1
800 TABLET ORAL EVERY 8 HOURS PRN
Qty: 30 TABLET | Refills: 0 | Status: CANCELLED | OUTPATIENT
Start: 2021-04-06

## 2021-04-06 RX ORDER — KETOROLAC TROMETHAMINE 30 MG/ML
60 INJECTION, SOLUTION INTRAMUSCULAR; INTRAVENOUS ONCE
Status: COMPLETED | OUTPATIENT
Start: 2021-04-06 | End: 2021-04-06

## 2021-04-06 RX ORDER — GABAPENTIN 300 MG/1
300 CAPSULE ORAL NIGHTLY
Qty: 90 CAPSULE | Refills: 1 | Status: CANCELLED | OUTPATIENT
Start: 2021-04-06 | End: 2021-07-05

## 2021-04-06 RX ADMIN — KETOROLAC TROMETHAMINE 60 MG: 30 INJECTION, SOLUTION INTRAMUSCULAR; INTRAVENOUS at 12:46
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
## 2021-04-06 NOTE — PATIENT INSTRUCTIONS
increase all cause mortality (strokes, BMI, cardiovascular)  8. Kale consumption can reduce onset of dementia  9. Walking at least 8000 steps per day and resistance exercise 2-3 x per week are good for your heart  10. Covid 19:  Wearing gloves is not that helpful  Having low vitamin D levels increases risk of infection (take 2-4000 units of D3 per day until our covid crisis is resolved)  11.  Brushing teeth 3 times per day can decrease chance of getting diabetes
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
## 2021-04-09 ENCOUNTER — HOSPITAL ENCOUNTER (OUTPATIENT)
Dept: GENERAL RADIOLOGY | Age: 21
Discharge: HOME | End: 2021-04-09
Payer: COMMERCIAL

## 2021-04-09 ENCOUNTER — HOSPITAL ENCOUNTER (OUTPATIENT)
Age: 21
Discharge: HOME | End: 2021-04-09
Payer: COMMERCIAL

## 2021-04-09 DIAGNOSIS — M54.41 ACUTE RIGHT-SIDED LOW BACK PAIN WITH RIGHT-SIDED SCIATICA: ICD-10-CM
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
## 2021-04-09 PROCEDURE — 72072 X-RAY EXAM THORAC SPINE 3VWS: CPT

## 2021-04-09 PROCEDURE — 72110 X-RAY EXAM L-2 SPINE 4/>VWS: CPT

## 2021-04-20 ENCOUNTER — OFFICE VISIT (OUTPATIENT)
Dept: FAMILY MEDICINE CLINIC | Age: 21
End: 2021-04-20
Payer: COMMERCIAL

## 2021-04-20 VITALS
HEART RATE: 64 BPM | WEIGHT: 182.2 LBS | DIASTOLIC BLOOD PRESSURE: 70 MMHG | SYSTOLIC BLOOD PRESSURE: 122 MMHG | TEMPERATURE: 97.5 F | OXYGEN SATURATION: 97 % | BODY MASS INDEX: 24.71 KG/M2

## 2021-04-20 DIAGNOSIS — R63.5 WEIGHT GAIN: ICD-10-CM

## 2021-04-20 DIAGNOSIS — S39.012D STRAIN OF LUMBAR REGION, SUBSEQUENT ENCOUNTER: ICD-10-CM

## 2021-04-20 DIAGNOSIS — R20.0 NUMBNESS IN LEFT LEG: ICD-10-CM

## 2021-04-20 DIAGNOSIS — M54.32 LEFT SIDED SCIATICA: ICD-10-CM
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
## 2021-04-20 PROCEDURE — 99214 OFFICE O/P EST MOD 30 MIN: CPT | Performed by: FAMILY MEDICINE

## 2021-04-20 RX ORDER — GABAPENTIN 400 MG/1
400 CAPSULE ORAL 4 TIMES DAILY
Qty: 120 CAPSULE | Refills: 1 | Status: SHIPPED | OUTPATIENT
Start: 2021-04-20 | End: 2021-05-20 | Stop reason: SDUPTHER

## 2021-04-20 RX ORDER — IBUPROFEN 800 MG/1
800 TABLET ORAL EVERY 8 HOURS PRN
Qty: 60 TABLET | Refills: 0 | Status: SHIPPED | OUTPATIENT
Start: 2021-04-20 | End: 2021-05-20 | Stop reason: SDUPTHER

## 2021-04-20 ASSESSMENT — ENCOUNTER SYMPTOMS
SINUS PRESSURE: 0
COUGH: 0
SINUS PAIN: 0
DIARRHEA: 0
EYE REDNESS: 0
SHORTNESS OF BREATH: 0
RHINORRHEA: 0
BACK PAIN: 1
ABDOMINAL PAIN: 0
SORE THROAT: 0
BOWEL INCONTINENCE: 0
VOMITING: 0
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
## 2021-04-20 NOTE — PATIENT INSTRUCTIONS
Need help scheduling your covid vaccine? Call [facility] hotline: [phone] or go to [email or web address]        PLEASE BRING YOUR MEDICATIONS TO ALL APPOINTMENTS    The diagnoses and medications listed in this after visit summary may not be accurate at the time of check out. Please check MY CHART in 28-48 hours for possible corrections. Late cancellation policy: So that we can better accommodate people who are sick, please give our office 24 hour notice for an appointment cancellation. Thank you. Missed appointments: Your care is very important to us. It is important that you keep your scheduled appointments. Multiple missed appointments will lead to a dismissal from the office. Later arrival policy: If you are more than 10 minutes late for your appointment, you will be asked to re-schedule. Please allow 5-7 business days for paperwork to be processed. It is important that you check your MY Chart messages, as they include appointment reminders, test results, and other important information. If you have forgotten your password, please call [phone]. HERE ARE SOME LIFE CHANGING TIPS      1. Take your blood pressure medications at bedtime. This reduces your chance of cardiovascular event by half  2. Fever in kids:  Give both Tylenol and Ibuprofen at the same time rather than staggering them which is confusing  3. Follow these tips to reduce childhood obesity: Reduce unnecessary exposure to antibiotics, consume whole milk instead of skim milk, watch public TV instead of regular TV (less exposure to junk food commercials), and reduce traumatic experiences. 4. 1 egg per day is good for your heart  5. Alternate day fasting does promote weight loss. Skipping breakfast increases your risk of obesity  6. Artificially sweetened drinks increase all cause mortality (strokes, BMI, cardiovascular)  7. Kale consumption can reduce onset of dementia  8.  Walking at least 8000 steps per day and resistance exercise 2-3 x per week are good for your heart  9.  Brushing teeth 3 times per day can decrease chance of getting diabetes

## 2021-04-22 ENCOUNTER — HOSPITAL ENCOUNTER (OUTPATIENT)
Dept: MRI IMAGING | Age: 21
Discharge: HOME | End: 2021-04-22
Payer: COMMERCIAL

## 2021-04-22 DIAGNOSIS — R20.0 NUMBNESS IN LEFT LEG: ICD-10-CM

## 2021-04-22 DIAGNOSIS — S39.012D STRAIN OF LUMBAR REGION, SUBSEQUENT ENCOUNTER: ICD-10-CM
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
## 2021-04-22 DIAGNOSIS — M54.32 LEFT SIDED SCIATICA: ICD-10-CM

## 2021-04-22 PROCEDURE — 72148 MRI LUMBAR SPINE W/O DYE: CPT

## 2021-04-23 ENCOUNTER — HOSPITAL ENCOUNTER (OUTPATIENT)
Dept: PHYSICAL THERAPY | Age: 21
Setting detail: THERAPIES SERIES
Discharge: HOME | End: 2021-04-23
Payer: COMMERCIAL

## 2021-04-23 DIAGNOSIS — M48.061 SPINAL STENOSIS OF LUMBAR REGION WITHOUT NEUROGENIC CLAUDICATION: Primary | ICD-10-CM

## 2021-04-23 DIAGNOSIS — M54.32 LEFT SIDED SCIATICA: ICD-10-CM
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
## 2021-04-23 DIAGNOSIS — R20.0 NUMBNESS IN LEFT LEG: ICD-10-CM

## 2021-04-23 PROCEDURE — 97140 MANUAL THERAPY 1/> REGIONS: CPT

## 2021-04-23 PROCEDURE — 97162 PT EVAL MOD COMPLEX 30 MIN: CPT

## 2021-04-23 PROCEDURE — 97110 THERAPEUTIC EXERCISES: CPT

## 2021-04-23 ASSESSMENT — PAIN DESCRIPTION - FREQUENCY: FREQUENCY: CONTINUOUS

## 2021-04-23 ASSESSMENT — PAIN DESCRIPTION - ONSET: ONSET: GRADUAL

## 2021-04-23 ASSESSMENT — PAIN DESCRIPTION - PROGRESSION: CLINICAL_PROGRESSION: GRADUALLY WORSENING

## 2021-04-23 ASSESSMENT — PAIN DESCRIPTION - LOCATION: LOCATION: LEG

## 2021-04-23 ASSESSMENT — PAIN - FUNCTIONAL ASSESSMENT: PAIN_FUNCTIONAL_ASSESSMENT: PREVENTS OR INTERFERES SOME ACTIVE ACTIVITIES AND ADLS
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
## 2021-04-24 ENCOUNTER — HOSPITAL ENCOUNTER (OUTPATIENT)
Dept: PHYSICAL THERAPY | Age: 21
Setting detail: THERAPIES SERIES
Discharge: HOME | End: 2021-04-24
Payer: COMMERCIAL

## 2021-04-24 NOTE — FLOWSHEET NOTE
Physical Therapy  Cancellation/No-show Note  Patient Name:  Sonam Garcia  :  2000   Date:  2021  Cancelled visits to date: 0  No-shows to date: 0    For today's appointment patient:  [x]  Cancelled  []  Rescheduled appointment  []  No-show     Reason given by patient:  []  Patient ill  []  Conflicting appointment  []  No transportation    []  Conflict with work  [x]  No reason given  []  Other:     Comments:   Left VM to reschedule Saturday appointment.      Electronically signed by:  Girish Chase PTA, CLT 2021, 9:06 AM

## 2021-04-26 ENCOUNTER — VIRTUAL VISIT (OUTPATIENT)
Dept: FAMILY MEDICINE CLINIC | Age: 21
End: 2021-04-26

## 2021-04-26 DIAGNOSIS — M48.061 SPINAL STENOSIS OF LUMBAR REGION WITHOUT NEUROGENIC CLAUDICATION: ICD-10-CM
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
## 2021-04-26 DIAGNOSIS — M54.32 LEFT SIDED SCIATICA: ICD-10-CM

## 2021-04-26 PROCEDURE — 99213 OFFICE O/P EST LOW 20 MIN: CPT | Performed by: FAMILY MEDICINE
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
## 2021-04-27 ENCOUNTER — TELEPHONE (OUTPATIENT)
Dept: FAMILY MEDICINE CLINIC | Age: 21
End: 2021-04-27
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
## 2021-04-27 DIAGNOSIS — M54.32 LEFT SIDED SCIATICA: ICD-10-CM

## 2021-04-27 DIAGNOSIS — M48.061 SPINAL STENOSIS OF LUMBAR REGION WITHOUT NEUROGENIC CLAUDICATION: ICD-10-CM

## 2021-04-27 NOTE — TELEPHONE ENCOUNTER
Patient asking for new referral to Dr Kirk Weaver for a series of back injections.  Please fax referral along with Xray and MRI

## 2021-04-28 ENCOUNTER — HOSPITAL ENCOUNTER (OUTPATIENT)
Dept: PHYSICAL THERAPY | Age: 21
Setting detail: THERAPIES SERIES
Discharge: HOME | End: 2021-04-28
Payer: COMMERCIAL

## 2021-04-28 PROCEDURE — 97140 MANUAL THERAPY 1/> REGIONS: CPT

## 2021-04-28 PROCEDURE — 97110 THERAPEUTIC EXERCISES: CPT
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
## 2021-05-07 ENCOUNTER — HOSPITAL ENCOUNTER (OUTPATIENT)
Dept: PHYSICAL THERAPY | Age: 21
Setting detail: THERAPIES SERIES
Discharge: HOME | End: 2021-05-07
Payer: COMMERCIAL

## 2021-05-07 PROCEDURE — 97110 THERAPEUTIC EXERCISES: CPT

## 2021-05-07 PROCEDURE — 97140 MANUAL THERAPY 1/> REGIONS: CPT

## 2021-05-07 PROCEDURE — 97112 NEUROMUSCULAR REEDUCATION: CPT
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
## 2021-05-10 ENCOUNTER — HOSPITAL ENCOUNTER (OUTPATIENT)
Dept: PHYSICAL THERAPY | Age: 21
Setting detail: THERAPIES SERIES
Discharge: HOME | End: 2021-05-10
Payer: COMMERCIAL

## 2021-05-10 PROCEDURE — 97140 MANUAL THERAPY 1/> REGIONS: CPT

## 2021-05-10 PROCEDURE — 97110 THERAPEUTIC EXERCISES: CPT

## 2021-05-10 NOTE — FLOWSHEET NOTE
LBP and overall reduced independence with pain free ADL/IADL and work-related task participation. Pt would benefit from skilled therapy interventions to address listed impairments, progress toward goal completion and improve ADL/IADL status. PT also warranted to reduce risk for further injury or decline. Subjective:  Pt stated he is doing well this date. He did well after last session and was having less pain. He has been busy lately but feels not working has helped his back to relax and reduce his symptoms overall. Any changes in Ambulatory Summary Sheet? None      Objective:   COVID screening questions were asked and patient attested that there had been no contact or symptoms    Pt was 15 min late to apt this date    Exercises: (No more than 4 columns)   Exercise/Equipment 4/28/21 #2 5/7/21  #3 5/10/21 #4           WARM UP         bike x5' 5' 5'         TABLE      PPT X15, 5\"  X15, 5\"   Abdominal bracing  5x5\" hold    Piriformis stretch X30\" R     Hamstring stretch      Sciatic nerve glides Supine 2x30 pumps        Bridges X10, 3\" 1x10 3\"hold X10, 3\"   DKTC w/ SB   x10 GSB w/ pause at top   STANDING      B row  DGTT 1x15    B ext  DGTT 1x15    B push  DGTT 1x15    Side pulls  DGTT 1x15 R/L ea    [employer] carry  12#db 160' ea    Sit to stand  23\" 2x10 X15, TA engagement    Bar push/pull  (freemotion)  14# 1x8 each dir         Palloff press   RTB x10 ea side   PROPRIOCEPTION      Balance board  B F/S 60\" each                            MODALITIES      Traction Next?   Next - up to 50# to start in 90/90 flex position supine             Other Therapeutic Activities/Education:  HEP and importance of completion, updated sciatic nerve    Home Exercise Program: Issued, practiced and pt demo ability to perform 4/23/2021    Manual Treatments:  Stick rolling BL lumbar paraspinals, STM/ trigger point R glute/pirirofmis,  R leg pull for manual traction (slightly reduced radicular symptoms in RLE)    Modalities: none    Communication with other providers:  POC sent    Assessment: Pt tolerated today's treatment without any adverse reactions or complications this date. Pt had relief with RLE leg pull and DKTC with SB and therefore would benefit from mechanical traction next session to address radicular symptoms. Pt would continue to benefit from skilled therapy interventions to address remaining impairments, improve mobility and strength and progress toward goal completion while reducing risk for re-injury or further decline.   End pain: 4/10 - reduced radicular symptoms in RLE    Plan for Next Session: nustep, STM/ stick rolling R glutes/piriformis and BL paraspinals, flexion bias (see MRI for results of stenosis), sciatic nerve glides, ham stretching, possibly lumbar traction      Time In / Time Out:  0947 - 0915      Timed Code/Total Treatment Minutes: 28': 14' MT x1, 14' TE x1      Next Progress Note due:  10th visit      Plan of Care Interventions:  [x] Therapeutic Exercise  [x] Modalities:  [x] Therapeutic Activity     [x] Ultrasound  [x] Estim  [x] Gait Training      [] Cervical Traction [x] Lumbar Traction  [x] Neuromuscular Re-education    [x] Cold/hotpack [] Iontophoresis   [x] Instruction in HEP      [x] Vasopneumatic   [] Dry Needling    [x] Manual Therapy               [] Aquatic Therapy              Electronically signed by:  Lorena Berrios PT, DPT    5/10/2021, 8:46 AM

## 2021-05-13 ENCOUNTER — HOSPITAL ENCOUNTER (OUTPATIENT)
Dept: PHYSICAL THERAPY | Age: 21
Discharge: HOME | End: 2021-05-13

## 2021-05-13 NOTE — FLOWSHEET NOTE
Physical Therapy  Cancellation/No-show Note  Patient Name:  Lamont Olivarez  :  2000   Date:  2021  Cancelled visits to date: 3  No-shows to date: 0    For today's appointment patient:  [x]  Cancelled  []  Rescheduled appointment  []  No-show     Reason given by patient:  []  Patient ill  []  Conflicting appointment  []  No transportation    []  Conflict with work  []  No reason given  []  Other:     Comments:  TEST RESULTS FOR COVID-19 TEST HAS NOT BEEN DETERMINED YET.  TEST NEEDED FOR FUTURE SURGERY.     Electronically signed by:  Edmundo Norton PTA, CLT 2021, 10:58 AM

## 2021-05-18 ENCOUNTER — HOSPITAL ENCOUNTER (OUTPATIENT)
Dept: PHYSICAL THERAPY | Age: 21
Setting detail: THERAPIES SERIES
Discharge: HOME | End: 2021-05-18
Payer: COMMERCIAL

## 2021-05-18 PROCEDURE — 97140 MANUAL THERAPY 1/> REGIONS: CPT

## 2021-05-18 PROCEDURE — 97012 MECHANICAL TRACTION THERAPY: CPT

## 2021-05-18 PROCEDURE — 97110 THERAPEUTIC EXERCISES: CPT

## 2021-05-18 NOTE — FLOWSHEET NOTE
hamstring tightness, LBP and overall reduced independence with pain free ADL/IADL and work-related task participation. Pt would benefit from skilled therapy interventions to address listed impairments, progress toward goal completion and improve ADL/IADL status. PT also warranted to reduce risk for further injury or decline. Subjective:  Pt is doing well this date. He went back to work last night and it was going okay. He has the ability to take breaks when he needs, which helps his pain. His COVID test was negative and he has his injections tomorrow. Any changes in Ambulatory Summary Sheet?   None      Objective:   COVID screening questions were asked and patient attested that there had been no contact or symptoms    -radicular symptoms into R side to ankle prior to session start this date    Exercises: (No more than 4 columns)   Exercise/Equipment 5/7/21  #3 5/10/21 #4 5/18/21 #5           WARM UP         bike 5' 5' 5'         TABLE      PPT  X15, 5\" X15, 5\"   Abdominal bracing 5x5\" hold     Piriformis stretch      Hamstring stretch      Sciatic nerve glides         Bridges 1x10 3\"hold X10, 3\" X12, 3\"   DKTC w/ SB  x10 GSB w/ pause at top X15, 2-3 second pause at top   Supine march   x10 ea BLE               STANDING      B row DGTT 1x15     B ext DGTT 1x15     B push DGTT 1x15     Side pulls DGTT 1x15 R/L ea     [employer] carry 12#db 160' ea     Sit to stand 23\" 2x10 X15, TA engagement  X15, TA engagement    Bar push/pull  (freemotion) 14# 1x8 each dir          Palloff press  RTB x10 ea side GTB x10 ea side   Trunk rotation   GTB x10 ea side         PROPRIOCEPTION      Balance board B F/S 60\" each                             MODALITIES      Traction  Next - up to 50# to start in 90/90 flex position supine See below - max # due to pt body weight is 92lbs             Other Therapeutic Activities/Education:  HEP and importance of completion    Home Exercise Program: Issued, practiced and pt demo ability to perform 4/23/2021    Manual Treatments:  Stick rolling BL lumbar paraspinals,     Modalities:  Lumbar traction: precautions and contraindications reviewed and cleared prior to tx this date. Pt in supine with hips/knees at 90/90 on stool. Min 35#, max started at 50# and pt requested to go higher getting up to 75#, ramp 2 total of 15' tx. No adverse reactions or complications after tx and pt had reduction of R radicular symptoms afterwards. Communication with other providers:  POC sent    Assessment: Pt tolerated today's treatment without any adverse reactions or complications this date. Pt tolerated tractions well this date and had complete relief of R radicular symptoms after tx. Continue with traction as long as pt did not have worsening symptoms the night of or after first trial. Pt would continue to benefit from skilled therapy interventions to address remaining impairments, improve mobility and strength and progress toward goal completion while reducing risk for re-injury or further decline.   End pain: 0/10 - in RLE    Plan for Next Session: nustep, STM/ stick rolling R glutes/piriformis and BL paraspinals, flexion bias (see MRI for results of stenosis), sciatic nerve glides, ham stretching, possibly lumbar traction    Time In / Time Out:  3925 - 3646    Timed Code/Total Treatment Minutes: 35'/50': 15' traction x1, 12' MT x1, 23' TE x1    Next Progress Note due:  10th visit    Plan of Care Interventions:  [x] Therapeutic Exercise  [x] Modalities:  [x] Therapeutic Activity     [x] Ultrasound  [x] Estim  [x] Gait Training      [] Cervical Traction [x] Lumbar Traction  [x] Neuromuscular Re-education    [x] Cold/hotpack [] Iontophoresis   [x] Instruction in HEP      [x] Vasopneumatic   [] Dry Needling    [x] Manual Therapy               [] Aquatic Therapy              Electronically signed by:  Kimberley Maxwell PT, DPT   5/18/2021, 6:50 AM

## 2021-05-20 ENCOUNTER — OFFICE VISIT (OUTPATIENT)
Dept: FAMILY MEDICINE CLINIC | Age: 21
End: 2021-05-20
Payer: COMMERCIAL

## 2021-05-20 VITALS
TEMPERATURE: 98.8 F | HEART RATE: 100 BPM | OXYGEN SATURATION: 96 % | SYSTOLIC BLOOD PRESSURE: 128 MMHG | WEIGHT: 183 LBS | DIASTOLIC BLOOD PRESSURE: 70 MMHG | BODY MASS INDEX: 24.82 KG/M2

## 2021-05-20 DIAGNOSIS — Z79.1 NSAID LONG-TERM USE: ICD-10-CM

## 2021-05-20 DIAGNOSIS — M54.32 LEFT SIDED SCIATICA: ICD-10-CM
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
## 2021-05-20 DIAGNOSIS — M54.31 SCIATIC PAIN, RIGHT: Primary | ICD-10-CM

## 2021-05-20 DIAGNOSIS — S39.012D STRAIN OF LUMBAR REGION, SUBSEQUENT ENCOUNTER: ICD-10-CM

## 2021-05-20 PROCEDURE — 99213 OFFICE O/P EST LOW 20 MIN: CPT | Performed by: FAMILY MEDICINE

## 2021-05-20 RX ORDER — GABAPENTIN 400 MG/1
400 CAPSULE ORAL 4 TIMES DAILY
Qty: 120 CAPSULE | Refills: 1 | Status: SHIPPED | OUTPATIENT
Start: 2021-06-20 | End: 2023-07-21

## 2021-05-20 RX ORDER — OMEPRAZOLE 40 MG/1
40 CAPSULE, DELAYED RELEASE ORAL
Qty: 30 CAPSULE | Refills: 2 | Status: SHIPPED | OUTPATIENT
Start: 2021-05-20

## 2021-05-20 RX ORDER — IBUPROFEN 800 MG/1
800 TABLET ORAL EVERY 8 HOURS PRN
Qty: 90 TABLET | Refills: 1 | Status: SHIPPED | OUTPATIENT
Start: 2021-05-20 | End: 2021-10-01 | Stop reason: SDUPTHER

## 2021-05-20 ASSESSMENT — ENCOUNTER SYMPTOMS
ABDOMINAL PAIN: 0
BOWEL INCONTINENCE: 0
SORE THROAT: 0
VOMITING: 0
BACK PAIN: 1
COUGH: 0

## 2021-05-20 NOTE — PATIENT INSTRUCTIONS
Need help scheduling your covid vaccine? [address] [phone] or Call [facility] hotline: [phone]         PLEASE BRING YOUR MEDICATIONS TO ALL APPOINTMENTS    The diagnoses and medications listed in this after visit summary may not be accurate at the time of check out. Please check MY CHART in 28-48 hours for possible corrections. Late cancellation policy: So that we can better accommodate people who are sick, please give our office 24 hour notice for an appointment cancellation. Thank you. Missed appointments: Your care is very important to us. It is important that you keep your scheduled appointments. Multiple missed appointments will lead to a dismissal from the office. Later arrival policy: If you are more than 10 minutes late for your appointment, you will be asked to re-schedule. Please allow 5-7 business days for paperwork to be processed. It is important that you check your MY Chart messages, as they include appointment reminders, test results, and other important information. If you have forgotten your password, please call [phone]. HERE ARE SOME LIFE CHANGING TIPS      1. Take your blood pressure medications at bedtime. This reduces your chance of cardiovascular event by half  2. Fever in kids:  Give both Tylenol and Ibuprofen at the same time rather than staggering them which is confusing  3. Follow these tips to reduce childhood obesity: Reduce unnecessary exposure to antibiotics, consume whole milk instead of skim milk, watch public TV instead of regular TV (less exposure to junk food commercials), and reduce traumatic experiences. 4. 1 egg per day is good for your heart  5. Alternate day fasting does promote weight loss. Skipping breakfast increases your risk of obesity  6. Artificially sweetened drinks increase all cause mortality (strokes, BMI, cardiovascular)  7.  Kale consumption can reduce onset of
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
## 2021-05-25 ENCOUNTER — TELEPHONE (OUTPATIENT)
Dept: FAMILY MEDICINE CLINIC | Age: 21
End: 2021-05-25

## 2021-05-25 ENCOUNTER — HOSPITAL ENCOUNTER (OUTPATIENT)
Dept: PHYSICAL THERAPY | Age: 21
Setting detail: THERAPIES SERIES
Discharge: HOME | End: 2021-05-25
Payer: COMMERCIAL

## 2021-05-25 PROCEDURE — 97110 THERAPEUTIC EXERCISES: CPT

## 2021-05-25 NOTE — FLOWSHEET NOTE
hamstring tightness, LBP and overall reduced independence with pain free ADL/IADL and work-related task participation. Pt would benefit from skilled therapy interventions to address listed impairments, progress toward goal completion and improve ADL/IADL status. PT also warranted to reduce risk for further injury or decline. Subjective:  Pt stated neurologist's report suggested he will need back surgery. Just found out a few minute before walking in to therapy. He will bring the report to next tx and keep us updated. Any changes in Ambulatory Summary Sheet?   None      Objective:   COVID screening questions were asked and patient attested that there had been no contact or symptoms    -radicular symptoms into R side to ankle prior to session start this date; improvement with tx    Exercises: (No more than 4 columns)   Exercise/Equipment 5/7/21  #3 5/10/21 #4 5/18/21 #5 5/25/21 #6            WARM UP          bike 5' 5' 5' 5'          TABLE       PPT  X15, 5\" X15, 5\" X 15, 5\"   Abdominal bracing 5x5\" hold      Piriformis stretch       Hamstring stretch       Sciatic nerve glides          Bridges 1x10 3\"hold X10, 3\" X12, 3\" X15, 3\"   DKTC w/ SB  x10 GSB w/ pause at top X15, 2-3 second pause at top X15, 2-3 second pause at top   Supine march   x10 ea BLE x20 ea BLE                 STANDING       B row DGTT 1x15   DGTT x 20   B ext DGTT 1x15   DGTT x 20   B push DGTT 1x15   DGTT x 20   Side pulls DGTT 1x15 R/L ea   DGTT x 20   [employer] carry 12#db 160' ea      Sit to stand 23\" 2x10 X15, TA engagement  X15, TA engagement  X20, TA engagement   Bar push/pull  (freemotion) 14# 1x8 each dir           Palloff press  RTB x10 ea side GTB x10 ea side GTB x15 ea side   Trunk rotation   GTB x10 ea side GTB x15 ea side          PROPRIOCEPTION       Balance board B F/S 60\" each                                  MODALITIES       Traction  Next - up to 50# to start in 90/90 flex position supine See below - max # due to pt body weight is 92lbs Hold              Other Therapeutic Activities/Education:  HEP and importance of completion    Home Exercise Program: Issued, practiced and pt demo ability to perform 4/23/2021    Manual Treatments:      Modalities: HOLD      Communication with other providers:  POC sent    Assessment: Pt tolerated GOOD tolerance to tx with decreased pain. Holding traction until patient updates PT with neurologist reports and new diagnosis. May need Lumbar surgery. Pt was unable to be clear with the new information today. Pt would continue to benefit from skilled therapy interventions to address remaining impairments, improve mobility and strength and progress toward goal completion while reducing risk for re-injury or further decline.   End pain: 3/10 - in RLE    Plan for Next Session: nustep, STM/ stick rolling R glutes/piriformis and BL paraspinals, flexion bias (see MRI for results of stenosis), sciatic nerve glides, ham stretching, possibly lumbar traction    Time In / Time Out:  96661 - 1040    Timed Code/Total Treatment Minutes: 40'/40': 40 TE    Next Progress Note due:  10th visit    Plan of Care Interventions:  [x] Therapeutic Exercise  [x] Modalities:  [x] Therapeutic Activity     [x] Ultrasound  [x] Estim  [x] Gait Training      [] Cervical Traction [x] Lumbar Traction  [x] Neuromuscular Re-education    [x] Cold/hotpack [] Iontophoresis   [x] Instruction in HEP      [x] Vasopneumatic   [] Dry Needling    [x] Manual Therapy               [] Aquatic Therapy              Electronically signed by:  David Lawson PTA, CLT   5/25/2021, 10:00 AM

## 2021-05-25 NOTE — TELEPHONE ENCOUNTER
Patients mother called stating you spoke to patient today. She said patient is freaking out and can not remember what you said to him. He does not remember the syndrome you told him he has. Patients mother would like to speak to you so she can help patient.

## 2021-05-25 NOTE — TELEPHONE ENCOUNTER
Patient called stating you spoke to him over a hour ago.  He can not remember all you told him and would like to you to call him

## 2021-05-25 NOTE — TELEPHONE ENCOUNTER
Please refer to my documentation and the [employer] message that I sent him last week.

## 2021-05-27 ENCOUNTER — HOSPITAL ENCOUNTER (OUTPATIENT)
Dept: PHYSICAL THERAPY | Age: 21
Discharge: HOME | End: 2021-05-27

## 2021-05-27 NOTE — FLOWSHEET NOTE
Physical Therapy  Cancellation/No-show Note  Patient Name:  Samantha Butt  :  2000   Date:  2021    D/c call to patient: 1    Cancelled visits to date: 3  No-shows to date: 0    For today's appointment patient:  [x]  Cancelled  []  Rescheduled appointment  []  No-show     Reason given by patient:  []  Patient ill  []  Conflicting appointment  []  No transportation    []  Conflict with work  []  No reason given  []  Other:     Comments:  Pt diagnosed with risk for cauda equina syndrome and plans for emergent back surgenry. Physical therapist called patient and discharged patient.      Electronically signed by:  Grace Garces PTA, CLT 2021, 10:03 AM
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
## 2021-06-27 DIAGNOSIS — Z79.1 NSAID LONG-TERM USE: ICD-10-CM

## 2021-06-28 RX ORDER — OMEPRAZOLE 40 MG/1
CAPSULE, DELAYED RELEASE ORAL
Qty: 30 CAPSULE | Refills: 2 | OUTPATIENT
Start: 2021-06-28

## 2021-07-21 ENCOUNTER — OFFICE VISIT (OUTPATIENT)
Dept: FAMILY MEDICINE CLINIC | Age: 21
End: 2021-07-21
Payer: COMMERCIAL

## 2021-07-21 VITALS
HEART RATE: 53 BPM | BODY MASS INDEX: 25.63 KG/M2 | DIASTOLIC BLOOD PRESSURE: 66 MMHG | SYSTOLIC BLOOD PRESSURE: 124 MMHG | HEIGHT: 72 IN | TEMPERATURE: 97.5 F | WEIGHT: 189.2 LBS | OXYGEN SATURATION: 98 %

## 2021-07-21 DIAGNOSIS — M54.31 SCIATIC PAIN, RIGHT: Primary | ICD-10-CM

## 2021-07-21 DIAGNOSIS — F32.1 CURRENT MODERATE EPISODE OF MAJOR DEPRESSIVE DISORDER WITHOUT PRIOR EPISODE (HCC): ICD-10-CM
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
## 2021-07-21 PROCEDURE — 99214 OFFICE O/P EST MOD 30 MIN: CPT | Performed by: FAMILY MEDICINE

## 2021-07-21 RX ORDER — DULOXETIN HYDROCHLORIDE 60 MG/1
60 CAPSULE, DELAYED RELEASE ORAL DAILY
Qty: 30 CAPSULE | Refills: 0 | Status: SHIPPED | OUTPATIENT
Start: 2021-07-21 | End: 2021-08-18 | Stop reason: SDUPTHER

## 2021-07-21 RX ORDER — TRAMADOL HYDROCHLORIDE 50 MG/1
50 TABLET ORAL EVERY 4 HOURS PRN
Qty: 28 TABLET | Refills: 0 | Status: SHIPPED | OUTPATIENT
Start: 2021-07-21 | End: 2021-07-28

## 2021-07-21 ASSESSMENT — ENCOUNTER SYMPTOMS
BOWEL INCONTINENCE: 0
BACK PAIN: 1

## 2021-07-21 NOTE — PATIENT INSTRUCTIONS
Need help scheduling your covid vaccine? [address] [phone]       PLEASE BRING YOUR MEDICATIONS TO ALL APPOINTMENTS    The diagnoses and medications listed in this after visit summary may not be accurate at the time of check out. Please check MY CHART in 28-48 hours for possible corrections. Late cancellation policy: So that we can better accommodate people who are sick, please give our office 24 hour notice for an appointment cancellation. Thank you. Missed appointments: Your care is very important to us. It is important that you keep your scheduled appointments. Multiple missed appointments will lead to a dismissal from the office. Later arrival policy: If you are more than 10 minutes late for your appointment, you will be asked to re-schedule. Please allow 5-7 business days for paperwork to be processed. It is important that you check your MY Chart messages, as they include appointment reminders, test results, and other important information. If you have forgotten your password, please call [phone]. HERE ARE SOME LIFE CHANGING TIPS      1. Take your blood pressure medications at bedtime. This reduces your chance of cardiovascular event by half  2. Fever in kids:  Give both Tylenol and Ibuprofen at the same time rather than staggering them which is confusing  3. Follow these tips to reduce childhood obesity: Reduce unnecessary exposure to antibiotics, consume whole milk instead of skim milk, watch public TV instead of regular TV (less exposure to junk food commercials), and reduce traumatic experiences. 4. 1 egg per day is good for your heart  5. Alternate day fasting does promote weight loss. Skipping breakfast increases your risk of obesity  6. Artificially sweetened drinks increase all cause mortality (strokes, BMI, cardiovascular)  7. Kale consumption can reduce onset of dementia  8.  Walking at least 8000 steps per day and resistance exercise 2-3 x per week are good for your heart  9.  Brushing teeth 3 times per day can decrease chance of getting diabetes
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
## 2021-08-18 ENCOUNTER — OFFICE VISIT (OUTPATIENT)
Dept: FAMILY MEDICINE CLINIC | Age: 21
End: 2021-08-18
Payer: COMMERCIAL

## 2021-08-18 VITALS
BODY MASS INDEX: 24.79 KG/M2 | WEIGHT: 183 LBS | HEIGHT: 72 IN | SYSTOLIC BLOOD PRESSURE: 124 MMHG | HEART RATE: 69 BPM | DIASTOLIC BLOOD PRESSURE: 76 MMHG | OXYGEN SATURATION: 97 %

## 2021-08-18 DIAGNOSIS — F32.1 CURRENT MODERATE EPISODE OF MAJOR DEPRESSIVE DISORDER WITHOUT PRIOR EPISODE (HCC): Primary | ICD-10-CM

## 2021-08-18 DIAGNOSIS — M54.31 SCIATIC PAIN, RIGHT: ICD-10-CM

## 2021-08-18 PROCEDURE — 99213 OFFICE O/P EST LOW 20 MIN: CPT | Performed by: FAMILY MEDICINE

## 2021-08-18 RX ORDER — DULOXETIN HYDROCHLORIDE 60 MG/1
60 CAPSULE, DELAYED RELEASE ORAL DAILY
Qty: 90 CAPSULE | Refills: 0 | Status: SHIPPED | OUTPATIENT
Start: 2021-08-18

## 2021-08-18 RX ORDER — TRAMADOL HYDROCHLORIDE 50 MG/1
1 TABLET ORAL EVERY 4 HOURS PRN
COMMUNITY
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
## 2021-08-18 NOTE — LETTER
[place] [address]  Phone: [phone]  Fax: [phone]    Yashira Larson MD        August 18, 2021     Patient: Stacy Rothman   YOB: 2000   Date of Visit: 8/18/2021       To Whom It May Concern: It is my medical opinion that Hector Barragan may return to light duty immediately with the following restrictions: lifting/carrying not to exceed 20 lbs. , pushing/pulling not to exceed 20 lbs. , bending/stooping not to exceed 4 x per hour, no ladders, no stairs, avoid excessive walking or standing. He is to work no more than 50 hours/week. Duration of restrictions (days):  90.  (until November 16, 2021). If you have any questions or concerns, please don't hesitate to call.     Sincerely,        Yashira Larson MD

## 2021-08-18 NOTE — PATIENT INSTRUCTIONS
Need help scheduling your covid vaccine? [address] [phone]       PLEASE BRING YOUR MEDICATIONS TO ALL APPOINTMENTS    The diagnoses and medications listed in this after visit summary may not be accurate at the time of check out. Please check MY CHART in 28-48 hours for possible corrections. Late cancellation policy: So that we can better accommodate people who are sick, please give our office 24 hour notice for an appointment cancellation. Thank you. Missed appointments: Your care is very important to us. It is important that you keep your scheduled appointments. Multiple missed appointments will lead to a dismissal from the office. Later arrival policy: If you are more than 10 minutes late for your appointment, you will be asked to re-schedule. Please allow 5-7 business days for paperwork to be processed. It is important that you check your MY Chart messages, as they include appointment reminders, test results, and other important information. If you have forgotten your password, please call [phone]. HERE ARE SOME LIFE CHANGING TIPS      1. Take your blood pressure medications at bedtime. This reduces your chance of cardiovascular event by half  2. Fever in kids:  Give both Tylenol and Ibuprofen at the same time rather than staggering them which is confusing  3. Follow these tips to reduce childhood obesity: Reduce unnecessary exposure to antibiotics, consume whole milk instead of skim milk, watch public TV instead of regular TV (less exposure to junk food commercials), and reduce traumatic experiences. 4. 1 egg per day is good for your heart  5. Alternate day fasting does promote weight loss. Skipping breakfast increases your risk of obesity  6. Artificially sweetened drinks increase all cause mortality (strokes, BMI, cardiovascular)  7. Kale consumption can reduce onset of dementia  8.  Walking at least 8000 steps per day and resistance exercise 2-3 x per week are good for your heart  9.  Brushing teeth 3 times per day can decrease chance of getting diabetes

## 2021-09-13 DIAGNOSIS — S39.012D STRAIN OF LUMBAR REGION, SUBSEQUENT ENCOUNTER: ICD-10-CM

## 2021-09-13 DIAGNOSIS — M54.31 SCIATIC PAIN, RIGHT: ICD-10-CM
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
## 2021-09-14 RX ORDER — IBUPROFEN 800 MG/1
800 TABLET ORAL EVERY 8 HOURS PRN
Qty: 90 TABLET | Refills: 1 | OUTPATIENT
Start: 2021-09-14

## 2021-10-01 ENCOUNTER — TELEPHONE (OUTPATIENT)
Dept: FAMILY MEDICINE CLINIC | Age: 21
End: 2021-10-01
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
## 2021-10-01 DIAGNOSIS — S39.012D STRAIN OF LUMBAR REGION, SUBSEQUENT ENCOUNTER: ICD-10-CM

## 2021-10-01 DIAGNOSIS — M54.31 SCIATIC PAIN, RIGHT: ICD-10-CM

## 2021-10-01 RX ORDER — IBUPROFEN 800 MG/1
800 TABLET ORAL EVERY 8 HOURS PRN
Qty: 90 TABLET | Refills: 0 | Status: SHIPPED | OUTPATIENT
Start: 2021-10-01 | End: 2021-10-28 | Stop reason: SDUPTHER

## 2021-10-01 NOTE — TELEPHONE ENCOUNTER
----- Message from Latia Brewster sent at 10/1/2021  8:37 AM EDT -----  Subject: Refill Request    QUESTIONS  Name of Medication? ibuprofen (ADVIL;MOTRIN) 800 MG tablet  Patient-reported dosage and instructions? 800MG tablet 3 times per day   How many days do you have left? 0  Preferred Pharmacy? [id number]  Pharmacy phone number (if available)? [phone]  ---------------------------------------------------------------------------  --------------  CALL BACK INFO  What is the best way for the office to contact you? OK to leave message on   voicemail  Preferred Call Back Phone Number?  [phone]
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
## 2021-10-28 DIAGNOSIS — S39.012D STRAIN OF LUMBAR REGION, SUBSEQUENT ENCOUNTER: ICD-10-CM

## 2021-10-28 DIAGNOSIS — M54.31 SCIATIC PAIN, RIGHT: ICD-10-CM

## 2021-10-28 RX ORDER — IBUPROFEN 800 MG/1
800 TABLET ORAL EVERY 8 HOURS PRN
Qty: 90 TABLET | Refills: 0 | Status: SHIPPED | OUTPATIENT
Start: 2021-10-28

## 2021-11-12 DIAGNOSIS — F32.1 CURRENT MODERATE EPISODE OF MAJOR DEPRESSIVE DISORDER WITHOUT PRIOR EPISODE (HCC): ICD-10-CM

## 2021-11-12 DIAGNOSIS — M54.31 SCIATIC PAIN, RIGHT: ICD-10-CM

## 2021-11-12 RX ORDER — DULOXETIN HYDROCHLORIDE 60 MG/1
CAPSULE, DELAYED RELEASE ORAL
Qty: 90 CAPSULE | Refills: 0 | OUTPATIENT
Start: 2021-11-12

## 2021-11-30 ENCOUNTER — HOSPITAL ENCOUNTER (OUTPATIENT)
Dept: PHYSICAL THERAPY | Age: 21
Setting detail: THERAPIES SERIES
Discharge: HOME | End: 2021-11-30
Payer: COMMERCIAL

## 2021-11-30 PROCEDURE — 97110 THERAPEUTIC EXERCISES: CPT

## 2021-11-30 PROCEDURE — 97162 PT EVAL MOD COMPLEX 30 MIN: CPT

## 2021-11-30 PROCEDURE — G0283 ELEC STIM OTHER THAN WOUND: HCPCS

## 2021-11-30 ASSESSMENT — PAIN DESCRIPTION - LOCATION: LOCATION: BACK;LEG

## 2021-11-30 ASSESSMENT — PAIN DESCRIPTION - FREQUENCY: FREQUENCY: CONTINUOUS

## 2021-11-30 ASSESSMENT — PAIN DESCRIPTION - ORIENTATION: ORIENTATION: RIGHT;LEFT

## 2021-11-30 ASSESSMENT — PAIN DESCRIPTION - ONSET: ONSET: PROGRESSIVE

## 2021-11-30 ASSESSMENT — PAIN SCALES - GENERAL: PAINLEVEL_OUTOF10: 7

## 2021-11-30 ASSESSMENT — PAIN DESCRIPTION - PROGRESSION: CLINICAL_PROGRESSION: GRADUALLY IMPROVING

## 2021-11-30 ASSESSMENT — PAIN - FUNCTIONAL ASSESSMENT: PAIN_FUNCTIONAL_ASSESSMENT: PREVENTS OR INTERFERES WITH ALL ACTIVE AND SOME PASSIVE ACTIVITIES

## 2021-11-30 ASSESSMENT — PAIN DESCRIPTION - PAIN TYPE: TYPE: SURGICAL PAIN
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
## 2021-11-30 NOTE — PROGRESS NOTES
Physical Therapy  Initial Assessment  Date: 2021  Patient Name: Miri Musa  MRN: [id number]  : 2000     Treatment Diagnosis: LBP, weakness, stiffness, poor endurance, neural tension    Restrictions  Position Activity Restriction  Other position/activity restrictions: sit every 30-60 mins, no more than 10 hours/day, 50 hours, week, 15# wt limit, no bending, twisting, no OH lifting or below counter. Subjective   General  Chart Reviewed: Yes  Patient assessed for rehabilitation services?: Yes  Additional Pertinent Hx: LBP sciatica since  at least  Referring Practitioner: Camila Sharpe)  Diagnosis: LBP, s/p lumbar laminectomy  General Comment  Comments: does still leg symptoms R LE w/ bending especially, but has learned to manage this w/ posture etc  PT Visit Information  PT Insurance Information: BCBS 60 PCY no DNT  Subjective  Subjective: DOS 21, lami but no fusion no hardware. Pain has been bad still since surgery, just started back to work last week and this has increased pain again. He does feel like the surgery helped w/ nerve impingement but still having the nerve symptoms too. Taking meds still, 3 800 mg ibuprofen and the gabapentin. Pain is increased w/ activity, rest does help bertrand if can get supine or S/L. Sleep is not good, can get 5-6 hours on a good night, maybe 8 occasionaly. Was off work 7 weeks  Pain Screening  Patient Currently in Pain: Yes  Pain Assessment  Pain Assessment: 0-10  Pain Level: 7 (max 9/10 bertrand w/ work or standing/walking)  Patient's Stated Pain Goal: 4  Pain Type: Surgical pain  Pain Location: Back; Leg  Pain Orientation: Right; Left (LB is pretty equal in LB R/L)  Pain Radiating Towards: R LE to foot  Pain Descriptors: Tingling; Numbness; Aching; Radiating; Vadim Huguenin;  Shooting (lateral L thigh can get numb/tingly and more often its R LE, still feels rubbing sensation in LB too)  Pain Frequency: Continuous  Pain Onset: Progressive  Clinical Progression: Gradually improving (just a little since surgery)  Functional Pain Assessment: Prevents or interferes with all active and some passive activities  Vital Signs  Patient Currently in Pain: Yes    Vision/Hearing       Orientation  Orientation  Overall Orientation Status: Within Normal Limits    Social/Functional History  Social/Functional History  Lives With: Alone (friend staying some)  Type of Home: House  Home Layout: Two level  ADL Assistance: Independent  Homemaking Assistance: Independent  Ambulation Assistance: Independent  Transfer Assistance: Independent  Occupation: Full time employment  Type of occupation: Pt is a  at work and has to stand all day  Leisure & Hobbies: strength training    Objective     Observation/Palpation  Posture: Fair  Palpation: TTP either side of incision w/ noted decrease in muscle tone too, some TTP above in T/L paraspinals too  Observation: Pt moving guarded and painful    Spine  Lumbar: flex finger tips just above knee, ext WFL, Rot NT yet, SB fingertips 2\" above knee w/ pain    Strength RLE  Strength RLE: WNL  Strength LLE  Strength LLE: WNL  Strength Other  Other: core fair to good seated w/ MMT     Additional Measures  Flexibility: hamstring 90/90: lacking 45 on R, lacking 35 on L  Special Tests: slump + R, mkld L neural tension also. More neural tension noted w/ R SLR vs L also. No LLD noted or pelvic alignment  Other: DTR: 2+ BL patellar and achilles tendon, some pain and guarding w/ transitional movements still  Sensation  Overall Sensation Status: WNL (will have numb areas at times, but not currently)           Assessment   Conditions Requiring Skilled Therapeutic Intervention  Body structures, Functions, Activity limitations: Decreased functional mobility ; Decreased ADL status; Decreased ROM; Decreased strength; Decreased high-level IADLs; Decreased balance;  Increased pain  Assessment: Pt is a 25 yo male who presents about 2 Short term goals: 3 weeks  Short term goal 1: Pt will be able to advance core, hip and trunk exercise w/o increased pain  Short term goal 2: Pt will be able to report at least 10-15% reduction in pain  Long term goals  Time Frame for Long term goals : 6 weeks  Long term goal 1: Pt will be able to report at least 25% reduction in pain/symptoms  Long term goal 2: Pt will be indpendent w/ HEP and able to return to some light gym work  Long term goal 3: Pt will have improved Oswestry by 20% or more  Long term goal 4: Pt will be able to work w/ pain < 5/10 at least 50% of the time  Patient Goals   Patient goals : learn how to stretch back and assoc regions, be able to care for self better and be more active and lifting again if can.        Remi Leo, PT  PT, MPT, ATC     11/30/2021, 5:09 PM
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
## 2021-12-01 NOTE — FLOWSHEET NOTE
Patients Plan of Care was received and signed. Signed POC was scanned and placed in the patients chart.     Tresa Moise

## 2021-12-03 ENCOUNTER — HOSPITAL ENCOUNTER (OUTPATIENT)
Dept: PHYSICAL THERAPY | Age: 21
Setting detail: THERAPIES SERIES
Discharge: HOME | End: 2021-12-03
Payer: COMMERCIAL

## 2021-12-03 PROCEDURE — 97110 THERAPEUTIC EXERCISES: CPT

## 2021-12-03 PROCEDURE — 97112 NEUROMUSCULAR REEDUCATION: CPT
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
## 2021-12-03 NOTE — FLOWSHEET NOTE
Physical Therapy  Cancellation/No-show Note  Patient Name:  Jose Armando Galeano  :  2000   Date:  12/3/2021  Cancelled visits to date: 0  No-shows to date: 1    For today's appointment patient:  []  Cancelled  []  Rescheduled appointment  [x]  No-show     Reason given by patient:  []  Patient ill  []  Conflicting appointment  []  No transportation    []  Conflict with work  [x]  No reason given  []  Other:     Comments:      Electronically signed by:  Dominique Cevallos, PTA [id number]

## 2021-12-07 ENCOUNTER — HOSPITAL ENCOUNTER (OUTPATIENT)
Dept: PHYSICAL THERAPY | Age: 21
Setting detail: THERAPIES SERIES
Discharge: HOME | End: 2021-12-07
Payer: COMMERCIAL

## 2021-12-07 PROCEDURE — 97112 NEUROMUSCULAR REEDUCATION: CPT

## 2021-12-07 PROCEDURE — 97110 THERAPEUTIC EXERCISES: CPT
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
## 2021-12-09 ENCOUNTER — HOSPITAL ENCOUNTER (OUTPATIENT)
Dept: PHYSICAL THERAPY | Age: 21
Setting detail: THERAPIES SERIES
Discharge: HOME | End: 2021-12-09
Payer: COMMERCIAL

## 2021-12-09 PROCEDURE — 97110 THERAPEUTIC EXERCISES: CPT

## 2021-12-09 PROCEDURE — 97112 NEUROMUSCULAR REEDUCATION: CPT
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
## 2021-12-16 ENCOUNTER — HOSPITAL ENCOUNTER (OUTPATIENT)
Dept: PHYSICAL THERAPY | Age: 21
Setting detail: THERAPIES SERIES
Discharge: HOME | End: 2021-12-16
Payer: COMMERCIAL

## 2021-12-16 PROCEDURE — 97110 THERAPEUTIC EXERCISES: CPT
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
## 2021-12-21 ENCOUNTER — HOSPITAL ENCOUNTER (OUTPATIENT)
Dept: PHYSICAL THERAPY | Age: 21
Setting detail: THERAPIES SERIES
Discharge: HOME | End: 2021-12-21
Payer: COMMERCIAL

## 2021-12-21 PROCEDURE — 97110 THERAPEUTIC EXERCISES: CPT
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
## 2021-12-28 ENCOUNTER — HOSPITAL ENCOUNTER (OUTPATIENT)
Dept: PHYSICAL THERAPY | Age: 21
Setting detail: THERAPIES SERIES
Discharge: HOME | End: 2021-12-28
Payer: COMMERCIAL

## 2021-12-28 PROCEDURE — 97110 THERAPEUTIC EXERCISES: CPT

## 2021-12-28 PROCEDURE — 97140 MANUAL THERAPY 1/> REGIONS: CPT

## 2021-12-28 NOTE — FLOWSHEET NOTE
Patients Plan of Care was received and signed. Signed POC was scanned and placed in the patients chart.     Shaunna García

## 2021-12-28 NOTE — PROGRESS NOTES
Outpatient Physical Therapy           [place]           [x] Phone: [phone]   Fax: [phone]  [place]           [] Phone: [phone]   Fax: [phone]      To: Amaris Teague)    From: Briana Shin PT, PT     Patient: Kassie Ford                  : 2000  Diagnosis:  LBP, s/p lumbar laminectomy   Date: 2021  Treatment Diagnosis: LBP, weakness, stiffness, poor endurance, neural tension      [x]  Progress Note                []  Discharge Note    Evaluation Date:  2021 Total Visits to date:   7 Cancels/No-shows to date:  2    Subjective:  Pt is on shut down this week so pain not quite as bad. Pt feels like he is maybe 20% better since starting PT but still has bad days and increased activity like work still increases pain. Plan of Care/Treatment to date:  [x] Therapeutic Exercise    [x] Modalities:  [x] Therapeutic Activity     [] Ultrasound  [x] Electrical Stimulation  [] Gait Training      [] Cervical Traction   [] Lumbar Traction  [x] Neuromuscular Re-education  [x] Cold/hotpack [] Iontophoresis  [x] Instruction in HEP      Other:  [x] Manual Therapy       []  Vasopneumatic  [] Aquatic Therapy       []   Dry Needle Therapy                      Objective/Significant Findings At Last Visit/Comments:  COVID screening questions were asked and patient attested that there had been no contact or symptoms     Lumbar AROM flex can reach just below knee now w/o pain, Rot is [facility] w/ min pain, SB reaches knees now but still some pain B. Hamstring 90/90 lacking 34 on L, R 38., min nerve pain w/ this test.   + SLR but less symptoms this am, + slump also, but almost full knee ext now. Abdominal strength 4+/5 w/o pain. Trunk ext NT yet    Demonstrated good tolerance of prone plank on knees and quadriped alt hip ext - no increase in symptoms. Assessment:   Pt demonstrated overall good tolerance of today's session without adverse reaction.  Reports of less pain and stiffness following session. Pt is making progress towards goals but continues to have pain and radicular symptoms, especially w/ WB activity, RLE to foot and LLE to thigh. Pt will cont to benefit from PT to help slowly regain mobility and strength as well as endurance and train for independent return to exercise. End pain 3-4/10      Goal Status:  [] Achieved [x] Partially Achieved  [] Not Achieved   Patient goals : learn how to stretch back and assoc regions, be able to care for self better and be more active and lifting again if can. Short term goals  Time Frame for Short term goals: 3 weeks  Short term goal 1: Pt will be able to advance core, hip and trunk exercise w/o increased pain  Mostly met  Short term goal 2: Pt will be able to report at least 10-15% reduction in pain  Only temp after PT  Long term goals  Time Frame for Long term goals : 6 weeks  Slow progress towards all  Long term goal 1: Pt will be able to report at least 25% reduction in pain/symptoms    Long term goal 2: Pt will be indpendent w/ HEP and able to return to some light gym work  Long term goal 3: Pt will have improved Oswestry by 20% or more  Long term goal 4: Pt will be able to work w/ pain < 5/10 at least 50% of the time  Oswestry 58% at [facility], 12/28/21: 52%    Frequency/Duration:  # Days per week: [] 1 day # Weeks: [] 1 week [] 4 weeks [] 8 weeks     [x] 2 days   [] 2 weeks [] 5 weeks [] 10 weeks     [] 3 days   [] 3 weeks [x] 6 weeks [] 12 weeks       Rehab Potential: [] Excellent [x] Good [] [place]att [place]  [] Poor         Patient Status: [x] Continue per initial plan of Care     [] Patient now discharged     [x] Additional visits requested, Please re-certify for additional visits:      Requested frequency/duration: 1-2/week for 4 weeks    If we are requesting more visits, we fully anticipate the patient's condition is expected to improve within the treatment timeframe we are requesting.     Electronically signed by: Malathi Mitchell, PT, PT, MPT, ATC  12/28/2021, 6:43 AM    12/28/2021, 9:40 AM  If you have any questions or concerns, please don't hesitate to call.   Thank you for your referral.    Physician Signature:______________________ Date:______ Time: ________  By signing above, therapists plan is approved by physician
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
## 2021-12-30 ENCOUNTER — HOSPITAL ENCOUNTER (OUTPATIENT)
Dept: PHYSICAL THERAPY | Age: 21
Setting detail: THERAPIES SERIES
Discharge: HOME | End: 2021-12-30
Payer: COMMERCIAL

## 2021-12-30 PROCEDURE — 97140 MANUAL THERAPY 1/> REGIONS: CPT

## 2021-12-30 PROCEDURE — 97110 THERAPEUTIC EXERCISES: CPT
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
## 2022-01-04 ENCOUNTER — HOSPITAL ENCOUNTER (OUTPATIENT)
Dept: PHYSICAL THERAPY | Age: 22
Setting detail: THERAPIES SERIES
Discharge: HOME | End: 2022-01-04
Payer: COMMERCIAL

## 2022-01-04 PROCEDURE — 97110 THERAPEUTIC EXERCISES: CPT

## 2022-01-04 PROCEDURE — 97112 NEUROMUSCULAR REEDUCATION: CPT
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
## 2022-01-11 ENCOUNTER — HOSPITAL ENCOUNTER (OUTPATIENT)
Dept: PHYSICAL THERAPY | Age: 22
Setting detail: THERAPIES SERIES
Discharge: HOME | End: 2022-01-11
Payer: COMMERCIAL

## 2022-01-11 PROCEDURE — 97110 THERAPEUTIC EXERCISES: CPT

## 2022-01-11 PROCEDURE — 97112 NEUROMUSCULAR REEDUCATION: CPT
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
## 2022-01-13 ENCOUNTER — HOSPITAL ENCOUNTER (OUTPATIENT)
Dept: PHYSICAL THERAPY | Age: 22
Setting detail: THERAPIES SERIES
Discharge: HOME | End: 2022-01-13
Payer: COMMERCIAL

## 2022-01-13 PROCEDURE — 97110 THERAPEUTIC EXERCISES: CPT

## 2022-01-13 PROCEDURE — 97112 NEUROMUSCULAR REEDUCATION: CPT
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
## 2022-01-20 ENCOUNTER — HOSPITAL ENCOUNTER (OUTPATIENT)
Dept: PHYSICAL THERAPY | Age: 22
Discharge: HOME | End: 2022-01-20

## 2022-01-20 NOTE — FLOWSHEET NOTE
Physical Therapy  Cancellation/No-show Note  Patient Name:  Emmanuel Cunningham  :  2000   Date:  2022  Cancelled visits to date: 1  No-shows to date: 2    For today's appointment patient:  [x]  Cancelled  []  Rescheduled appointment  []  No-show     Reason given by patient:  []  Patient ill  []  Conflicting appointment  []  No transportation    []  Conflict with work  []  No reason given  []  Other:     Comments:       Electronically signed by:  Melani Mckeon    2022, 4:04 PM

## 2022-02-01 ENCOUNTER — HOSPITAL ENCOUNTER (OUTPATIENT)
Dept: PHYSICAL THERAPY | Age: 22
Setting detail: THERAPIES SERIES
Discharge: HOME | End: 2022-02-01
Payer: COMMERCIAL

## 2022-02-01 PROCEDURE — 97112 NEUROMUSCULAR REEDUCATION: CPT

## 2022-02-01 PROCEDURE — 97110 THERAPEUTIC EXERCISES: CPT

## 2022-02-01 NOTE — PROGRESS NOTES
on L, R 24 w/ some leg symptom.   + SLR R but less symptoms this am, + slump R also, but almost full knee ext now.    Abdominal strength 5/5 w/o pain.      Assessment: Pt demonstrated overall good tolerance of today's session without adverse reaction. No radicular symptoms noted today during session. Pt is making nice progress towards his goals but continues w/ some functional weakness and decreased activity tolerance. Pt will continue to benefit from PT to help slowly regain mobility and strength as well as endurance and train for independent return to exercise and full duty at work w/ less pain. End pain 0/10      Goal Status:  [] Achieved [x] Partially Achieved  [] Not Achieved    Patient goals : learn how to stretch back and assoc regions, be able to care for self better and be more active and lifting again if can.   Short term goals  Time Frame for Short term goals: 3 weeks  Short term goal 1: Pt will be able to advance core, hip and trunk exercise w/o increased pain  Mostly met  Short term goal 2: Pt will be able to report at least 10-15% reduction in pain    Pt reports maybe 10% 2/1/22  Long term goals  Time Frame for Long term goals : 6 weeks     Long term goal 1: Pt will be able to report at least 25% reduction in pain/symptoms    Progressing   Long term goal 2: Pt will be indpendent w/ HEP and able to return to some light gym work   Progressing  Long term goal 3: Pt will have improved Oswestry by 20% or more  Met  Long term goal 4: Pt will be able to work w/ pain < 5/10 at least 50% of the time   Pt reports maybe 20-25%   Oswestry 58% at [facility], 12/28/21: 52%, 2/1/22: 32%    Frequency/Duration:  # Days per week: [] 1 day # Weeks: [] 1 week [] 4 weeks [] 8 weeks     [x] 2 days   [] 2 weeks [] 5 weeks [] 10 weeks     [] 3 days   [] 3 weeks [x] 6 weeks [] 12 weeks       Rehab Potential: [] Excellent [x] Good [] Fair  [] Poor         Patient Status: [x] Continue per initial plan of Care, up to 20 sessions prn     [] Patient now discharged     [] Additional visits requested, Please re-certify for additional visits:      Requested frequency/duration: /week for  weeks    If we are requesting more visits, we fully anticipate the patient's condition is expected to improve within the treatment timeframe we are requesting. Electronically signed by:  Marlon Morrow, PT, PT, MPT, ATC  2/1/2022, 6:44 AM     2/1/2022, 4:49 PM   If you have any questions or concerns, please don't hesitate to call.   Thank you for your referral.    Physician Signature:______________________ Date:______ Time: ________  By signing above, therapists plan is approved by physician
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
## 2022-02-02 NOTE — FLOWSHEET NOTE
Patients Plan of Care was received and signed. Signed POC was scanned and placed in the patients chart.     Gutierrez Echeverria

## 2025-01-31 ENCOUNTER — TELEPHONE (OUTPATIENT)
Dept: FAMILY MEDICINE CLINIC | Age: 25
End: 2025-01-31

## 2025-01-31 NOTE — TELEPHONE ENCOUNTER
Lm on vm to call our office. Form in blue folder for emotional support animal. Patient does not fill out paperwork for pets.   Recommend he have his therapist to sign. Form in blue folder for .

## 2025-02-14 NOTE — TELEPHONE ENCOUNTER
Tried calling patient tried calling both numbers that is on file left a message on the cell phone no answer on the land line